# Patient Record
Sex: FEMALE | Race: WHITE | NOT HISPANIC OR LATINO | Employment: OTHER | URBAN - METROPOLITAN AREA
[De-identification: names, ages, dates, MRNs, and addresses within clinical notes are randomized per-mention and may not be internally consistent; named-entity substitution may affect disease eponyms.]

---

## 2017-11-25 ENCOUNTER — HOSPITAL ENCOUNTER (EMERGENCY)
Facility: MEDICAL CENTER | Age: 77
End: 2017-11-26
Attending: EMERGENCY MEDICINE
Payer: MEDICARE

## 2017-11-25 ENCOUNTER — APPOINTMENT (OUTPATIENT)
Dept: RADIOLOGY | Facility: MEDICAL CENTER | Age: 77
End: 2017-11-25
Attending: EMERGENCY MEDICINE
Payer: MEDICARE

## 2017-11-25 DIAGNOSIS — K64.4 EXTERNAL HEMORRHOID, BLEEDING: ICD-10-CM

## 2017-11-25 DIAGNOSIS — K80.20 SYMPTOMATIC CHOLELITHIASIS: ICD-10-CM

## 2017-11-25 LAB
ALBUMIN SERPL BCP-MCNC: 3.6 G/DL (ref 3.2–4.9)
ALBUMIN/GLOB SERPL: 1.5 G/DL
ALP SERPL-CCNC: 49 U/L (ref 30–99)
ALT SERPL-CCNC: 6 U/L (ref 2–50)
ANION GAP SERPL CALC-SCNC: 7 MMOL/L (ref 0–11.9)
AST SERPL-CCNC: 11 U/L (ref 12–45)
BASOPHILS # BLD AUTO: 0.3 % (ref 0–1.8)
BASOPHILS # BLD: 0.02 K/UL (ref 0–0.12)
BILIRUB SERPL-MCNC: 0.5 MG/DL (ref 0.1–1.5)
BUN SERPL-MCNC: 10 MG/DL (ref 8–22)
CALCIUM SERPL-MCNC: 7.9 MG/DL (ref 8.5–10.5)
CHLORIDE SERPL-SCNC: 107 MMOL/L (ref 96–112)
CO2 SERPL-SCNC: 23 MMOL/L (ref 20–33)
CREAT SERPL-MCNC: 0.72 MG/DL (ref 0.5–1.4)
EOSINOPHIL # BLD AUTO: 0.11 K/UL (ref 0–0.51)
EOSINOPHIL NFR BLD: 1.6 % (ref 0–6.9)
ERYTHROCYTE [DISTWIDTH] IN BLOOD BY AUTOMATED COUNT: 41.3 FL (ref 35.9–50)
GFR SERPL CREATININE-BSD FRML MDRD: >60 ML/MIN/1.73 M 2
GLOBULIN SER CALC-MCNC: 2.4 G/DL (ref 1.9–3.5)
GLUCOSE SERPL-MCNC: 109 MG/DL (ref 65–99)
HCT VFR BLD AUTO: 43.8 % (ref 37–47)
HGB BLD-MCNC: 14.8 G/DL (ref 12–16)
IMM GRANULOCYTES # BLD AUTO: 0.02 K/UL (ref 0–0.11)
IMM GRANULOCYTES NFR BLD AUTO: 0.3 % (ref 0–0.9)
LIPASE SERPL-CCNC: 29 U/L (ref 11–82)
LYMPHOCYTES # BLD AUTO: 0.97 K/UL (ref 1–4.8)
LYMPHOCYTES NFR BLD: 14.2 % (ref 22–41)
MCH RBC QN AUTO: 30.3 PG (ref 27–33)
MCHC RBC AUTO-ENTMCNC: 33.8 G/DL (ref 33.6–35)
MCV RBC AUTO: 89.6 FL (ref 81.4–97.8)
MONOCYTES # BLD AUTO: 0.86 K/UL (ref 0–0.85)
MONOCYTES NFR BLD AUTO: 12.6 % (ref 0–13.4)
NEUTROPHILS # BLD AUTO: 4.87 K/UL (ref 2–7.15)
NEUTROPHILS NFR BLD: 71 % (ref 44–72)
NRBC # BLD AUTO: 0 K/UL
NRBC BLD AUTO-RTO: 0 /100 WBC
PLATELET # BLD AUTO: 174 K/UL (ref 164–446)
PMV BLD AUTO: 11 FL (ref 9–12.9)
POTASSIUM SERPL-SCNC: 3.3 MMOL/L (ref 3.6–5.5)
PROT SERPL-MCNC: 6 G/DL (ref 6–8.2)
RBC # BLD AUTO: 4.89 M/UL (ref 4.2–5.4)
SODIUM SERPL-SCNC: 137 MMOL/L (ref 135–145)
WBC # BLD AUTO: 6.9 K/UL (ref 4.8–10.8)

## 2017-11-25 PROCEDURE — 83690 ASSAY OF LIPASE: CPT

## 2017-11-25 PROCEDURE — 99285 EMERGENCY DEPT VISIT HI MDM: CPT

## 2017-11-25 PROCEDURE — 85025 COMPLETE CBC W/AUTO DIFF WBC: CPT

## 2017-11-25 PROCEDURE — 46600 DIAGNOSTIC ANOSCOPY SPX: CPT

## 2017-11-25 PROCEDURE — 96374 THER/PROPH/DIAG INJ IV PUSH: CPT | Mod: XU

## 2017-11-25 PROCEDURE — 700111 HCHG RX REV CODE 636 W/ 250 OVERRIDE (IP): Performed by: EMERGENCY MEDICINE

## 2017-11-25 PROCEDURE — 93005 ELECTROCARDIOGRAM TRACING: CPT | Performed by: EMERGENCY MEDICINE

## 2017-11-25 PROCEDURE — 84484 ASSAY OF TROPONIN QUANT: CPT

## 2017-11-25 PROCEDURE — 700105 HCHG RX REV CODE 258: Performed by: EMERGENCY MEDICINE

## 2017-11-25 PROCEDURE — 80053 COMPREHEN METABOLIC PANEL: CPT

## 2017-11-25 RX ORDER — ONDANSETRON 2 MG/ML
4 INJECTION INTRAMUSCULAR; INTRAVENOUS ONCE
Status: COMPLETED | OUTPATIENT
Start: 2017-11-25 | End: 2017-11-25

## 2017-11-25 RX ORDER — SODIUM CHLORIDE 9 MG/ML
1000 INJECTION, SOLUTION INTRAVENOUS ONCE
Status: COMPLETED | OUTPATIENT
Start: 2017-11-25 | End: 2017-11-26

## 2017-11-25 RX ADMIN — SODIUM CHLORIDE 1000 ML: 9 INJECTION, SOLUTION INTRAVENOUS at 23:10

## 2017-11-25 RX ADMIN — ONDANSETRON 4 MG: 2 INJECTION INTRAMUSCULAR; INTRAVENOUS at 23:10

## 2017-11-25 ASSESSMENT — PAIN SCALES - GENERAL: PAINLEVEL_OUTOF10: 4

## 2017-11-25 ASSESSMENT — LIFESTYLE VARIABLES: DO YOU DRINK ALCOHOL: NO

## 2017-11-26 VITALS
WEIGHT: 209.44 LBS | DIASTOLIC BLOOD PRESSURE: 72 MMHG | RESPIRATION RATE: 16 BRPM | HEART RATE: 85 BPM | OXYGEN SATURATION: 96 % | BODY MASS INDEX: 34.89 KG/M2 | TEMPERATURE: 98.4 F | HEIGHT: 65 IN | SYSTOLIC BLOOD PRESSURE: 109 MMHG

## 2017-11-26 LAB — TROPONIN I SERPL-MCNC: <0.01 NG/ML (ref 0–0.04)

## 2017-11-26 PROCEDURE — 76705 ECHO EXAM OF ABDOMEN: CPT

## 2017-11-26 NOTE — ED NOTES
Assumed care of patient. Patient complains of LLQ abdominal pain with N/V/D with bloody stools today. Patient was seen at [facility] and told that she had nothing on her CT.  Patient alert and oriented x 4. Patient denies any other complaints at this time. Patient side rails up x 2 and call bell within reach.

## 2017-11-26 NOTE — ED PROVIDER NOTES
"ED Provider Note    Scribed for Rakesh Redding M.D. by Matt Knowles. 11/25/2017, 10:43 PM.    Primary care provider: Pcp Pt States None  Means of arrival: ambulance  History obtained from: Patient  History limited by: none    CHIEF COMPLAINT  Chief Complaint   Patient presents with   • Abdominal Pain     Patient complains of LLQ abdominal pain with N/V/D with blood noted in stool.       HPI  Gianna Delarosa is a 77 y.o. female who presents to the Emergency Department for evaluation of left-lower abdominal pain onset a few days ago. The patient reports her abdominal pain has been constant since onset, and at times it radiates to her back. She endorses associated generalized chest pain, bloody stools, nausea, vomiting, diarrhea. She states her chest pain began tonight as she was arriving to the ED, and describes her chest pain as a \"tightness\".  Patient's son reports patient had a small amount of blood in her stool this morning, and patient states the bloody stools began yesterday. The patient adds she has had multiple episodes of emesis since the onset of her symptoms. She was able to tolerate food today per patient's son. She explains her bloody stools as having \"some bright red blood and some clots\". Patient's son notes the patient has a history of external hemorrhoids. Additionally, patient states she was experiencing diarrhea yesterday from 3:00AM-7:00AM that has since resolved. The patient does not note any exacerbating or alleviating factors. Per patient's son, she was seen in [facility] ED yesterday and had an abdominal CT with no findings. She confirms a history of stroke and heart attack in 1997. Patient is wearing a nasal cannula with 2 L of supplemental oxygen on exam. She denies fever, dysuria, constipation, hematemesis, melena.     REVIEW OF SYSTEMS  Pertinent positives include left-sided abdominal pain, chest pain, bloody stools, nausea, vomiting, diarrhea. Patient denies fever, dysuria, " "constipation, hematemesis, melena. All other systems are reviewed and negative.    C.      PAST MEDICAL HISTORY   has a past medical history of A mass; CAD (coronary artery disease); and Immune deficiency disorder (CMS-HCC).    SURGICAL HISTORY   has a past surgical history that includes gyn surgery; other; and cataract phaco with iol (7/22/08).    SOCIAL HISTORY  Social History   Substance Use Topics   • Smoking status: Never Smoker   • Smokeless tobacco: Never Used   • Alcohol use No      History   Drug Use No       FAMILY HISTORY  History reviewed. No pertinent family history.    CURRENT MEDICATIONS  Reviewed.  See Encounter Summary.     ALLERGIES  Allergies   Allergen Reactions   • Aspirin    • Keflex    • Naproxen    • Other Drug      Mycins, antidepressants   • Penicillins    • Telithromycin        PHYSICAL EXAM  VITAL SIGNS: /72   Pulse 85   Temp 36.9 °C (98.4 °F)   Resp 16   Ht 1.651 m (5' 5\")   Wt 95 kg (209 lb 7 oz)   SpO2 91%   BMI 34.85 kg/m²    Pulse ox interpretation: I interpret this pulse ox as low.  Constitutional: Alert, uncomfortable appearing, actively vomiting.   HENT: Head normocephalic, atraumatic, Bilateral external ears normal, Nose normal.  Eyes: Pupils are equal, extraocular movements intact, Conjunctiva normal, Non-icteric.   Neck: Normal range of motion, Supple, No stridor.   Cardiovascular: Regular rate and rhythm. No murmurs, rubs, or gallops.   Thorax & Lungs: No acute respiratory distress, No wheezing, No increased work of breathing. Lungs are clear to auscultation.   Abdomen: Soft, RUQ tenderness  with minimal volunatary guarding, no rebound tenderness. Remainder of abdomen is soft and nontender. Non-distended, No pulsatile masses, No peritoneal signs. Actively vomiting, Non-bloody, non-billious.   Rectal: multiple external hemorrhoids, small amount of bleeding from the external hemorrhoids. No internal hemorrhoids. Scant bright red blood.   Skin: Warm, Dry, No " erythema, No rash.   Extremities: Intact distal pulses, No edema, No tenderness, No cyanosis.    Neurologic: Alert , Normal motor function, Normal sensory function, No focal deficits noted.   Psychiatric: Affect normal, Judgment normal, Mood normal.       DIAGNOSTIC STUDIES / PROCEDURES     LABS  Results for orders placed or performed during the hospital encounter of 11/25/17   COMP METABOLIC PANEL   Result Value Ref Range    Sodium 137 135 - 145 mmol/L    Potassium 3.3 (L) 3.6 - 5.5 mmol/L    Chloride 107 96 - 112 mmol/L    Co2 23 20 - 33 mmol/L    Anion Gap 7.0 0.0 - 11.9    Glucose 109 (H) 65 - 99 mg/dL    Bun 10 8 - 22 mg/dL    Creatinine 0.72 0.50 - 1.40 mg/dL    Calcium 7.9 (L) 8.5 - 10.5 mg/dL    AST(SGOT) 11 (L) 12 - 45 U/L    ALT(SGPT) 6 2 - 50 U/L    Alkaline Phosphatase 49 30 - 99 U/L    Total Bilirubin 0.5 0.1 - 1.5 mg/dL    Albumin 3.6 3.2 - 4.9 g/dL    Total Protein 6.0 6.0 - 8.2 g/dL    Globulin 2.4 1.9 - 3.5 g/dL    A-G Ratio 1.5 g/dL   CBC WITH DIFFERENTIAL   Result Value Ref Range    WBC 6.9 4.8 - 10.8 K/uL    RBC 4.89 4.20 - 5.40 M/uL    Hemoglobin 14.8 12.0 - 16.0 g/dL    Hematocrit 43.8 37.0 - 47.0 %    MCV 89.6 81.4 - 97.8 fL    MCH 30.3 27.0 - 33.0 pg    MCHC 33.8 33.6 - 35.0 g/dL    RDW 41.3 35.9 - 50.0 fL    Platelet Count 174 164 - 446 K/uL    MPV 11.0 9.0 - 12.9 fL    Neutrophils-Polys 71.00 44.00 - 72.00 %    Lymphocytes 14.20 (L) 22.00 - 41.00 %    Monocytes 12.60 0.00 - 13.40 %    Eosinophils 1.60 0.00 - 6.90 %    Basophils 0.30 0.00 - 1.80 %    Immature Granulocytes 0.30 0.00 - 0.90 %    Nucleated RBC 0.00 /100 WBC    Neutrophils (Absolute) 4.87 2.00 - 7.15 K/uL    Lymphs (Absolute) 0.97 (L) 1.00 - 4.80 K/uL    Monos (Absolute) 0.86 (H) 0.00 - 0.85 K/uL    Eos (Absolute) 0.11 0.00 - 0.51 K/uL    Baso (Absolute) 0.02 0.00 - 0.12 K/uL    Immature Granulocytes (abs) 0.02 0.00 - 0.11 K/uL    NRBC (Absolute) 0.00 K/uL   LIPASE   Result Value Ref Range    Lipase 29 11 - 82 U/L   ESTIMATED  GFR   Result Value Ref Range    GFR If African American >60 >60 mL/min/1.73 m 2    GFR If Non African American >60 >60 mL/min/1.73 m 2   TROPONIN   Result Value Ref Range    Troponin I <0.01 0.00 - 0.04 ng/mL   EKG (ER)   Result Value Ref Range    Report       [facility] Emergency Dept.    Test Date:  2017  Pt Name:    IMANI DANIEL                Department: ER  MRN:        [id number]                      Room:        20  Gender:     F                            Technician: [id number]  :        [id number]                   Requested By:ROSEANN BARAHONA  Order #:    [id number]                    Reading MD:    Measurements  Intervals                                Axis  Rate:       70                           P:          24  CO:         188                          QRS:        -15  QRSD:       130                          T:          88  QT:         456  QTc:        493    Interpretive Statements  SINUS RHYTHM  LEFT BUNDLE BRANCH BLOCK  Compared to ECG 2008 23:19:39  Left bundle-branch block now present  Myocardial infarct finding no longer present        All labs were reviewed by me.      EKG  12 Lead EKG interpreted by me to show:  Normal sinus rhythm  Rate of 70   Left bundle branch block   Intervals: Prolonged QRS, otherwise normal intervals  No ST-T wave changes suggestive of ischemia  Impression: Left bundle branch block, Prolonged QRS, otherwise normal EKG      RADIOLOGY  US-GALLBLADDER   Final Result      1.  Cholelithiasis without evidence for biliary obstruction      2.  Right hydronephrosis. Right renal cortical thinning consistent with scarring        The radiologist's interpretation of all radiological studies have been reviewed by me.    Anoscopy Procedure Note  Indication: Rectal bleeding    Procedure: The patient was placed in the left lateral decubitus position.  External inspection of the rectum and perianal area revealed an external hemorrhoid at the 9 o'clock  position.  A digital rectal exam was not performed. The anoscope was gently inserted and the bowel was inspected.  Visualization was excellent.  Mucosa was normal.  Anoscopy revealed a oozing external hemorrhoid.     The patient tolerated the procedure well.    Complication: None       COURSE & MEDICAL DECISION MAKING  Pertinent Labs & Imaging studies reviewed. (See chart for details)    10:43 PM - Patient seen and examined at bedside. Patient will be treated with 1000 mL NS infusion, 4 mg Zofran. The patient is treated with IV fluids secondary to concerns for dehydration from nausea and vomiting. Ordered US gallbladder, CMP, CBC with differential, Lipase to evaluate her symptoms. I will also perform a rectal exam with anoscopy procedure. Patient was counseled on the procedure and the risks associated. Patient and her son are agreeable to the plan as above.     11:32 PM - Performed rectal exam with anoscopy procedure, chaperoned by female RN. See physical exam findings and procedure note as above. Ordered Troponin, EKG to further evaluate.     1:24 AM - I reevaluated the patient at bedside. She is resting comfortably, and she is feeling improved. I updated the patient on her lab and imaging results as above. I advised patient to follow-up with general surgery, for which I will provide a referral. The patient was given return precautions and welcomed to return to the ED with new or worsening symptoms. She understood and verbalized agreement.     Decision Making:  This is a 77 y.o. year old female who presents with abdominal pain, nausea and vomiting. Here on examination the patient has a fairly unremarkable exam, though does have some right upper quadrant tenderness, and external hemorrhoids on rectal exam. External hemorrhoids likely explain the patient's rectal bleeding, particularly in light of no significant lower abdominal pain. Felt to be less likely was diverticulitis given no lower abdominal pain, no fevers,  or significant tenderness in general on exam. The patient does have some mild right upper quadrant tenderness however, an ultrasound here does show gallstones without evidence of cholecystitis. The patient has no significant LFT elevation, leukocytosis, or fever, and given this and general well appearance, I think the patient likely has symptomatic cholelithiasis. The patient was given Zofran here, and had improvement of her vomiting. She declined opioid analgesia, and given her cholelithiasis, the patient will be given follow-up with general surgery, and instructions to return here should she develop severe right upper quadrant pain, intractable vomiting, fever, or any other new or concerning symptoms.    The patient will return for new or worsening symptoms and is stable at the time of discharge.    The patient is referred to a primary physician for blood pressure management, diabetic screening, and for all other preventative health concerns.    DISPOSITION:  Patient will be discharged home in stable condition.    FOLLOW UP:  Viridiana Aden M.D.  [address] [place] [id number]  [phone]    Schedule an appointment as soon as possible for a visit          FINAL IMPRESSION  1. External hemorrhoid, bleeding    2. Symptomatic cholelithiasis          Matt ERICKSON (Sandro), am scribing for, and in the presence of, Rakesh Redding M.D..    Electronically signed by: Matt Knowles (Sandro), 11/25/2017    Rakesh ERICKSON M.D. personally performed the services described in this documentation, as scribed by Matt Knowles in my presence, and it is both accurate and complete.    The note accurately reflects work and decisions made by me.  Rakesh Redding  11/26/2017  1:42 AM

## 2017-11-26 NOTE — ED NOTES
Patient given discharge paperwork and verbalized understanding. Patient out of ED via wheelchair with son. Patient in stable condition and vitals stable and no distress noted.

## 2018-02-04 LAB — EKG IMPRESSION: NORMAL

## 2018-08-18 ENCOUNTER — HOSPITAL ENCOUNTER (EMERGENCY)
Facility: MEDICAL CENTER | Age: 78
End: 2018-08-18
Attending: EMERGENCY MEDICINE
Payer: MEDICARE

## 2018-08-18 VITALS
TEMPERATURE: 98.3 F | DIASTOLIC BLOOD PRESSURE: 82 MMHG | SYSTOLIC BLOOD PRESSURE: 109 MMHG | WEIGHT: 177 LBS | BODY MASS INDEX: 29.45 KG/M2 | RESPIRATION RATE: 16 BRPM | HEART RATE: 80 BPM | OXYGEN SATURATION: 94 %

## 2018-08-18 DIAGNOSIS — B88.9 MITE INFESTATION: ICD-10-CM

## 2018-08-18 PROCEDURE — 99283 EMERGENCY DEPT VISIT LOW MDM: CPT

## 2018-08-18 ASSESSMENT — PAIN SCALES - GENERAL
PAINLEVEL_OUTOF10: 0
PAINLEVEL_OUTOF10: 0

## 2018-08-18 ASSESSMENT — PAIN SCALES - WONG BAKER: WONGBAKER_NUMERICALRESPONSE: DOESN'T HURT AT ALL

## 2018-08-19 NOTE — DISCHARGE INSTRUCTIONS
Mites, Adult  Introduction  Scabies is a skin condition that happens when very small insects get under the skin (infestation). This causes a rash and severe itchiness. Scabies can spread from person to person (is contagious). If you get scabies, it is common for others in your household to get scabies too.  With proper treatment, symptoms usually go away in 2-4 weeks. Scabies usually does not cause lasting problems.  What are the causes?  This condition is caused by mites (Sarcoptes scabiei, or human itch mites) that can only be seen with a microscope. The mites get into the top layer of skin and lay eggs. Scabies can spread from person to person through:  · Close contact with a person who has scabies.  · Contact with infested items, such as towels, bedding, or clothing.  What increases the risk?  This condition is more likely to develop in:  · People who live in nursing homes and other extended-care facilities.  · People who have sexual contact with a partner who has scabies.  · Young children who attend  facilities.  · People who care for others who are at increased risk for scabies.  What are the signs or symptoms?  Symptoms of this condition may include:  · Severe itchiness. This is often worse at night.  · A rash that includes tiny red bumps or blisters. The rash commonly occurs on the wrist, elbow, armpit, fingers, waist, groin, or buttocks. Bumps may form a line (burrow) in some areas.  · Skin irritation. This can include scaly patches or sores.  How is this diagnosed?  This condition is diagnosed with a physical exam. Your health care provider will look closely at your skin. In some cases, your health care provider may take a sample of your affected skin (skin scraping) and have it examined under a microscope.  How is this treated?  This condition may be treated with:  · Medicated cream or lotion that kills the mites. This is spread on the entire body and left on for several hours. Usually, one  treatment with medicated cream or lotion is enough to kill all of the mites. In severe cases, the treatment may be repeated.  · Medicated cream that relieves itching.  · Medicines that help to relieve itching.  · Medicines that kill the mites. This treatment is rarely used.  Follow these instructions at home:     Medicines  · Take or apply over-the-counter and prescription medicines as told by your health care provider.  · Apply medicated cream or lotion as told by your health care provider.  · Do not wash off the medicated cream or lotion until the necessary amount of time has passed.  Skin Care  · Avoid scratching your affected skin.  · Keep your fingernails closely trimmed to reduce injury from scratching.  · Take cool baths or apply cool washcloths to help reduce itching.  General instructions  · Clean all items that you recently had contact with, including bedding, clothing, and furniture. Do this on the same day that your treatment starts.  ¨ Use hot water when you wash items.  ¨ Place unwashable items into closed, airtight plastic bags for at least 3 days. The mites cannot live for more than 3 days away from human skin.  ¨ Vacuum furniture and mattresses that you use.  · Make sure that other people who may have been infested are examined by a health care provider. These include members of your household and anyone who may have had contact with infested items.  · Keep all follow-up visits as told by your health care provider. This is important.  Contact a health care provider if:  · You have itching that does not go away after 4 weeks of treatment.  · You continue to develop new bumps or burrows.  · You have redness, swelling, or pain in your rash area after treatment.  · You have fluid, blood, or pus coming from your rash.  This information is not intended to replace advice given to you by your health care provider. Make sure you discuss any questions you have with your health care provider.  Document  Released: 09/07/2016 Document Revised: 05/25/2017 Document Reviewed: 07/19/2016  © 2017 [employer]

## 2018-08-19 NOTE — ED PROVIDER NOTES
ED Provider Note    CHIEF COMPLAINT  Chief Complaint   Patient presents with   • Rash     Reports first noticed 2 wks ago, and is in groin area and scalp. Pt states she thinks she has scabies.        HPI  Gianna Delarosa is a 77 y.o. female who presents for evaluation of possible mite infestation, resides with her son is also checking in, states that she has been experiencing generalized itching and is concerned about bites.  No fever, no vomiting, no other complaints    REVIEW OF SYSTEMS  Negative for fever, chest pain, dyspnea, abdominal pain, back pain.     PAST MEDICAL HISTORY   has a past medical history of A mass; CAD (coronary artery disease); and Immune deficiency disorder (CMS-HCC) (HCC).    SOCIAL HISTORY  Social History     Social History Main Topics   • Smoking status: Never Smoker   • Smokeless tobacco: Never Used   • Alcohol use No   • Drug use: No   • Sexual activity: Not on file       SURGICAL HISTORY   has a past surgical history that includes gyn surgery; other; and cataract phaco with iol (7/22/08).    CURRENT MEDICATIONS  I personally reviewed the medication list in the charting documentation.     ALLERGIES  Allergies   Allergen Reactions   • Aspirin    • Keflex    • Naproxen    • Other Drug      Mycins, antidepressants   • Penicillins    • Telithromycin        PHYSICAL EXAM  VITAL SIGNS: /82   Pulse 80   Temp 36.8 °C (98.3 °F)   Resp 16   Wt 80.3 kg (177 lb)   SpO2 94%   BMI 29.45 kg/m²   Constitutional: Alert in no apparent distress.  HENT: No signs of trauma.   Eyes: Conjunctiva normal, Non-icteric.   Chest: Normal nonlabored respirations  Skin: Dry skin, a few nonspecific maculopapular lesions, overall mild  Musculoskeletal: Good range of motion in all major joints.   Neurologic: Alert, No focal deficits noted.   Psychiatric: Affect normal, Judgment normal.    COURSE & MEDICAL DECISION MAKING  Pertinent Labs & Imaging studies reviewed. (See chart for details)    Encounter  Summary: This is a 77 y.o. female with concerns over a mite infestation, normal findings on exam but will treat with permethrin, strict return instructions have been discussed      DISPOSITION: Discharge Home      FINAL IMPRESSION  1. Mite infestation        This dictation was created using voice recognition software. The accuracy of the dictation is limited to the abilities of the software. I expect there may be some errors of grammar and possibly content. The nursing notes were reviewed and certain aspects of this information were incorporated into this note.    Electronically signed by: Sergio Leon, 8/18/2018 9:06 PM

## 2018-08-19 NOTE — ED TRIAGE NOTES
Chief Complaint   Patient presents with   • Rash     Reports first noticed 2 wks ago, and is in groin area and scalp. Pt states she thinks she has scabies.      /82   Pulse 80   Temp 36.8 °C (98.3 °F)   Resp 16   Wt 80.3 kg (177 lb)   SpO2 94%   BMI 29.45 kg/m²     Pt to triage via WC. Pt here with son as well. Charge RN notified of pt, roomed to YL55.

## 2018-09-27 ENCOUNTER — APPOINTMENT (OUTPATIENT)
Dept: RADIOLOGY | Facility: MEDICAL CENTER | Age: 78
End: 2018-09-27
Attending: EMERGENCY MEDICINE
Payer: MEDICARE

## 2018-09-27 ENCOUNTER — HOSPITAL ENCOUNTER (EMERGENCY)
Facility: MEDICAL CENTER | Age: 78
End: 2018-09-27
Attending: EMERGENCY MEDICINE
Payer: MEDICARE

## 2018-09-27 VITALS
HEART RATE: 87 BPM | RESPIRATION RATE: 16 BRPM | OXYGEN SATURATION: 98 % | TEMPERATURE: 97.2 F | HEIGHT: 64 IN | SYSTOLIC BLOOD PRESSURE: 118 MMHG | BODY MASS INDEX: 30.22 KG/M2 | WEIGHT: 177.03 LBS | DIASTOLIC BLOOD PRESSURE: 78 MMHG

## 2018-09-27 DIAGNOSIS — E04.1 THYROID NODULE: ICD-10-CM

## 2018-09-27 DIAGNOSIS — R59.0 LYMPHADENOPATHY, SUBMANDIBULAR: ICD-10-CM

## 2018-09-27 LAB
ANION GAP SERPL CALC-SCNC: 8 MMOL/L (ref 0–11.9)
BASOPHILS # BLD AUTO: 0.4 % (ref 0–1.8)
BASOPHILS # BLD: 0.05 K/UL (ref 0–0.12)
BUN SERPL-MCNC: 10 MG/DL (ref 8–22)
CALCIUM SERPL-MCNC: 9.1 MG/DL (ref 8.5–10.5)
CHLORIDE SERPL-SCNC: 103 MMOL/L (ref 96–112)
CO2 SERPL-SCNC: 25 MMOL/L (ref 20–33)
CREAT SERPL-MCNC: 0.81 MG/DL (ref 0.5–1.4)
EOSINOPHIL # BLD AUTO: 0.04 K/UL (ref 0–0.51)
EOSINOPHIL NFR BLD: 0.3 % (ref 0–6.9)
ERYTHROCYTE [DISTWIDTH] IN BLOOD BY AUTOMATED COUNT: 41.4 FL (ref 35.9–50)
GLUCOSE SERPL-MCNC: 97 MG/DL (ref 65–99)
HCT VFR BLD AUTO: 48.7 % (ref 37–47)
HGB BLD-MCNC: 16.3 G/DL (ref 12–16)
IMM GRANULOCYTES # BLD AUTO: 0.03 K/UL (ref 0–0.11)
IMM GRANULOCYTES NFR BLD AUTO: 0.3 % (ref 0–0.9)
LYMPHOCYTES # BLD AUTO: 1.43 K/UL (ref 1–4.8)
LYMPHOCYTES NFR BLD: 12.2 % (ref 22–41)
MCH RBC QN AUTO: 30 PG (ref 27–33)
MCHC RBC AUTO-ENTMCNC: 33.5 G/DL (ref 33.6–35)
MCV RBC AUTO: 89.5 FL (ref 81.4–97.8)
MONOCYTES # BLD AUTO: 0.97 K/UL (ref 0–0.85)
MONOCYTES NFR BLD AUTO: 8.3 % (ref 0–13.4)
NEUTROPHILS # BLD AUTO: 9.19 K/UL (ref 2–7.15)
NEUTROPHILS NFR BLD: 78.5 % (ref 44–72)
NRBC # BLD AUTO: 0 K/UL
NRBC BLD-RTO: 0 /100 WBC
PLATELET # BLD AUTO: 235 K/UL (ref 164–446)
PMV BLD AUTO: 10.7 FL (ref 9–12.9)
POTASSIUM SERPL-SCNC: 3.9 MMOL/L (ref 3.6–5.5)
RBC # BLD AUTO: 5.44 M/UL (ref 4.2–5.4)
SODIUM SERPL-SCNC: 136 MMOL/L (ref 135–145)
TSH SERPL DL<=0.005 MIU/L-ACNC: 1.68 UIU/ML (ref 0.38–5.33)
WBC # BLD AUTO: 11.7 K/UL (ref 4.8–10.8)

## 2018-09-27 PROCEDURE — 80048 BASIC METABOLIC PNL TOTAL CA: CPT

## 2018-09-27 PROCEDURE — 70491 CT SOFT TISSUE NECK W/DYE: CPT

## 2018-09-27 PROCEDURE — 84443 ASSAY THYROID STIM HORMONE: CPT

## 2018-09-27 PROCEDURE — 700117 HCHG RX CONTRAST REV CODE 255: Performed by: EMERGENCY MEDICINE

## 2018-09-27 PROCEDURE — 85025 COMPLETE CBC W/AUTO DIFF WBC: CPT

## 2018-09-27 PROCEDURE — 99284 EMERGENCY DEPT VISIT MOD MDM: CPT

## 2018-09-27 RX ORDER — AMOXICILLIN AND CLAVULANATE POTASSIUM 875; 125 MG/1; MG/1
1 TABLET, FILM COATED ORAL 2 TIMES DAILY
Qty: 20 TAB | Refills: 0 | Status: SHIPPED | OUTPATIENT
Start: 2018-09-27 | End: 2019-12-08

## 2018-09-27 RX ADMIN — IOHEXOL 80 ML: 350 INJECTION, SOLUTION INTRAVENOUS at 16:36

## 2018-09-27 NOTE — ED PROVIDER NOTES
ED Provider Note    Scribed for Luz Benz M.D. by Larry Wise. 9/27/2018, 3:16 PM.    Means of arrival: Walk-in  History obtained from: Patient  History limited by: None    CHIEF COMPLAINT  Chief Complaint   Patient presents with   • Oral Swelling     swelling to left lower jaw, hx of cellulitis   • Laryngitis     states father had vocal cord CA and wants to get it checked.       HPI  Gianna Delarosa is a 78 y.o. female who presents to the Emergency Department complaining of difficulty swallowing.    Patient states she has had difficulty swallowing for several years which has progressively been getting worse.  She feels that something gets stuck in her upper throat region and if she pushes or changes the position of her head she is able to dislodge it.  She states she occasionally coughs up a thick white sputum that she feels is also collecting in the same area.    She reports associated subjective fever with a recent cellulitis on the left jaw region.  She reports voice changes.  She denies night sweats, weight loss or diaphoresis.     Recently was diagnosed with cellulitis by someone at [facility].  She states she finished her antibiotics but is concerned that there might be something more going on given the cellulitis and worsening of her long-standing difficulty swallowing.        REVIEW OF SYSTEMS  Pertinent positives include difficulty swallowing, productive cough, subjective fever, and voice changes. Pertinent negatives include no night sweats, weight loss, diaphoresis. See HPI for further details. All other systems reviewed and negative.     PAST MEDICAL HISTORY   has a past medical history of A mass; CAD (coronary artery disease); and Immune deficiency disorder (HCC).    SURGICAL HISTORY   has a past surgical history that includes gyn surgery; other; and cataract phaco with iol (7/22/08).    SOCIAL HISTORY  Social History   Substance Use Topics   • Smoking status: Never Smoker   •  "Smokeless tobacco: Never Used   • Alcohol use No      History   Drug Use No       FAMILY HISTORY  Father: laryngeal cancer    CURRENT MEDICATIONS  Home Medications     Reviewed by Amirah Reynolds R.N. (Registered Nurse) on 09/27/18 at 1412  Med List Status: Complete   Medication Last Dose Status   permethrin (ELIMITE) 1 % lotion prn Active                ALLERGIES  Allergies   Allergen Reactions   • Aspirin    • Keflex    • Naproxen    • Other Drug      Mycins, antidepressants   • Penicillins    • Telithromycin        PHYSICAL EXAM  VITAL SIGNS: /67   Pulse 94   Temp 36.2 °C (97.2 °F)   Resp 16   Ht 1.626 m (5' 4\")   Wt 80.3 kg (177 lb 0.5 oz)   SpO2 99%   BMI 30.39 kg/m²     General: WDWN, nontoxic appearing in NAD; A+Ox3; V/S as above; afebrile  Skin: warm and dry; good color; no rash   HEENT: NCAT; EOMs intact; PERRL; no scleral icterus   Neck: FROM; no meningismus, no thyromegaly; trachea is midline; oropharynx clear; no trismus, no drooling; soft, nontender, mobile nodes in the submandibular region  Cardiovascular: Regular heart rate and rhythm. No murmurs, rubs, or gallops; pulses 2+ bilaterally radially   Lungs: Clear to auscultation with good air movement bilaterally. No wheezes, rhonchi, or rales.   Abdomen: BS present; soft; NTND; no rebound, guarding, or rigidity. No organomegaly or pulsatile mass; no CVAT   Extremities: SERRANO x 4; no e/o trauma; no pedal edema   Neurologic: CNs III-XII grossly intact; speech clear; distal sensation intact  Psychiatric: Appropriate affect, normal mood                                                           DIAGNOSTIC STUDIES / PROCEDURES    LABS  Results for orders placed or performed during the hospital encounter of 09/27/18   CBC WITH DIFFERENTIAL   Result Value Ref Range    WBC 11.7 (H) 4.8 - 10.8 K/uL    RBC 5.44 (H) 4.20 - 5.40 M/uL    Hemoglobin 16.3 (H) 12.0 - 16.0 g/dL    Hematocrit 48.7 (H) 37.0 - 47.0 %    MCV 89.5 81.4 - 97.8 fL    MCH 30.0 " 27.0 - 33.0 pg    MCHC 33.5 (L) 33.6 - 35.0 g/dL    RDW 41.4 35.9 - 50.0 fL    Platelet Count 235 164 - 446 K/uL    MPV 10.7 9.0 - 12.9 fL    Neutrophils-Polys 78.50 (H) 44.00 - 72.00 %    Lymphocytes 12.20 (L) 22.00 - 41.00 %    Monocytes 8.30 0.00 - 13.40 %    Eosinophils 0.30 0.00 - 6.90 %    Basophils 0.40 0.00 - 1.80 %    Immature Granulocytes 0.30 0.00 - 0.90 %    Nucleated RBC 0.00 /100 WBC    Neutrophils (Absolute) 9.19 (H) 2.00 - 7.15 K/uL    Lymphs (Absolute) 1.43 1.00 - 4.80 K/uL    Monos (Absolute) 0.97 (H) 0.00 - 0.85 K/uL    Eos (Absolute) 0.04 0.00 - 0.51 K/uL    Baso (Absolute) 0.05 0.00 - 0.12 K/uL    Immature Granulocytes (abs) 0.03 0.00 - 0.11 K/uL    NRBC (Absolute) 0.00 K/uL   BASIC METABOLIC PANEL   Result Value Ref Range    Sodium 136 135 - 145 mmol/L    Potassium 3.9 3.6 - 5.5 mmol/L    Chloride 103 96 - 112 mmol/L    Co2 25 20 - 33 mmol/L    Glucose 97 65 - 99 mg/dL    Bun 10 8 - 22 mg/dL    Creatinine 0.81 0.50 - 1.40 mg/dL    Calcium 9.1 8.5 - 10.5 mg/dL    Anion Gap 8.0 0.0 - 11.9   TSH   Result Value Ref Range    TSH 1.680 0.380 - 5.330 uIU/mL   ESTIMATED GFR   Result Value Ref Range    GFR If African American >60 >60 mL/min/1.73 m 2    GFR If Non African American >60 >60 mL/min/1.73 m 2       All labs reviewed by me.    RADIOLOGY  CT-SOFT TISSUE NECK WITH   Final Result      1.  Bilateral submandibular adenopathy. Findings could be reactive, inflammatory or neoplastic in etiology. Follow-up is recommended.   2.  Multiple prominent cervical lymph nodes, greater on the right.   3.  1.6 cm left thyroid nodule. Follow-up ultrasound is recommended.        The radiologist's interpretation of all radiological studies have been reviewed by me.    COURSE & MEDICAL DECISION MAKING  Pertinent Labs & Imaging studies reviewed. (See chart for details)    Gianna Delarosa is a 78 y.o. female who presents complaining of difficulty swallowing, voice change, and feeling like something gets stuck in her  throat occasionally.  This has been progressive.  Patient is able to take p.o.    Patient does have adenopathy on exam in the submandibular region.  No airway concerns at this time.  Patient's father has a history of laryngeal cancer.    3:16 PM - Patient seen and examined at bedside. Ordered CT soft tissue with contrast, TSH, BMP, and CBC with differential to evaluate her symptoms.    5:22 PM  Labs and CT results are noted.  Given the adenopathy that may be reactive, we will place the patient on Augmentin and refer to PCP and ENT for follow-up.  I will place her on Augmentin to treat any underlying infection.    FINAL IMPRESSION  1. Lymphadenopathy, submandibular    2. Thyroid nodule          Larry ERICKSON (Scribe), am scribing for, and in the presence of, Luz Benz M.D..    Electronically signed by: Larry Wise (Scribe), 9/27/2018    ILuz M.D. personally performed the services described in this documentation, as scribed by Larry Wise in my presence, and it is both accurate and complete. C    The note accurately reflects work and decisions made by me.  Luz Benz  9/27/2018  5:42 PM

## 2018-09-27 NOTE — ED TRIAGE NOTES
Gianna Delarosa  Chief Complaint   Patient presents with   • Oral Swelling     swelling to left lower jaw, hx of cellulitis   • Laryngitis     states father had vocal cord CA and wants to get it checked.     Pt to triage in w/c with above complaint. VSS.   Pt returned to lobby, educated on triage process, and to inform staff of any changes or concerns.

## 2019-12-08 ENCOUNTER — HOSPITAL ENCOUNTER (EMERGENCY)
Facility: MEDICAL CENTER | Age: 79
End: 2019-12-08
Attending: EMERGENCY MEDICINE
Payer: MEDICARE

## 2019-12-08 VITALS
HEIGHT: 64 IN | SYSTOLIC BLOOD PRESSURE: 129 MMHG | DIASTOLIC BLOOD PRESSURE: 85 MMHG | RESPIRATION RATE: 16 BRPM | BODY MASS INDEX: 25.61 KG/M2 | HEART RATE: 75 BPM | WEIGHT: 150 LBS | TEMPERATURE: 98.5 F | OXYGEN SATURATION: 97 %

## 2019-12-08 DIAGNOSIS — B02.9 HERPES ZOSTER WITHOUT COMPLICATION: ICD-10-CM

## 2019-12-08 PROCEDURE — 700102 HCHG RX REV CODE 250 W/ 637 OVERRIDE(OP): Performed by: EMERGENCY MEDICINE

## 2019-12-08 PROCEDURE — A9270 NON-COVERED ITEM OR SERVICE: HCPCS | Performed by: EMERGENCY MEDICINE

## 2019-12-08 PROCEDURE — 99284 EMERGENCY DEPT VISIT MOD MDM: CPT

## 2019-12-08 RX ORDER — OXYCODONE HYDROCHLORIDE 5 MG/1
5 TABLET ORAL EVERY 4 HOURS PRN
Qty: 5 TAB | Refills: 0 | Status: SHIPPED | OUTPATIENT
Start: 2019-12-08 | End: 2019-12-11

## 2019-12-08 RX ORDER — NAPROXEN 500 MG/1
500 TABLET ORAL ONCE
Status: COMPLETED | OUTPATIENT
Start: 2019-12-08 | End: 2019-12-08

## 2019-12-08 RX ORDER — VALACYCLOVIR HYDROCHLORIDE 500 MG/1
1000 TABLET, FILM COATED ORAL ONCE
Status: COMPLETED | OUTPATIENT
Start: 2019-12-08 | End: 2019-12-08

## 2019-12-08 RX ORDER — VALACYCLOVIR HYDROCHLORIDE 1 G/1
1000 TABLET, FILM COATED ORAL 3 TIMES DAILY
Qty: 21 TAB | Refills: 0 | Status: SHIPPED | OUTPATIENT
Start: 2019-12-08 | End: 2019-12-15

## 2019-12-08 RX ADMIN — VALACYCLOVIR HYDROCHLORIDE 1000 MG: 500 TABLET, FILM COATED ORAL at 11:45

## 2019-12-08 RX ADMIN — Medication 500 MG: at 11:45

## 2019-12-08 NOTE — ED TRIAGE NOTES
Chief Complaint   Patient presents with   • Neck Pain   • Rash   • Lump   Pt to triage in NAD.  Pt with multiple complaints but primary complaint is neck pain and rash.  Pt reports thyroid swelling and lump to left neck.  Pt educated on triage process and instructed to notify triage RN of any change in status.

## 2019-12-08 NOTE — ED NOTES
Pt received discharge instructions and prescriptions, pt understood all including follow up, pt ambulated to lobby with no difficulty

## 2019-12-08 NOTE — ED PROVIDER NOTES
"ED Provider Note    Scribed for Iftikhar Lockett M.D. by Joya Cardenas. 12/8/2019  11:01 AM    Primary care provider: Pcp Pt States None  Means of arrival: Walk-in  History obtained from: Patient  History limited by: None    CHIEF COMPLAINT  Chief Complaint   Patient presents with   • Neck Pain   • Rash   • Lump       HPI  Gianna Delarosa is a 79 y.o. female who presents to the Emergency Department for painful rash on neck onset 5-6 days. She notes the pain radiates from her left sided neck to the left side of the back of her head. No alleviating or exacerbating factors. Patient notes she did not report to the ED sooner because she was unable to get here. She denies any fevers.     REVIEW OF SYSTEMS  Pertinent positives include painful rash on neck. Pertinent negatives include no fevers.     PAST MEDICAL HISTORY   has a past medical history of A mass, CAD (coronary artery disease), and Immune deficiency disorder (HCC).    SURGICAL HISTORY   has a past surgical history that includes gyn surgery; other; and cataract phaco with iol (7/22/08).    SOCIAL HISTORY  Social History     Tobacco Use   • Smoking status: Never Smoker   • Smokeless tobacco: Never Used   Substance Use Topics   • Alcohol use: No   • Drug use: No      Social History     Substance and Sexual Activity   Drug Use No       FAMILY HISTORY  None noted.     CURRENT MEDICATIONS  None noted.    ALLERGIES  Allergies   Allergen Reactions   • Aspirin    • Keflex    • Naproxen    • Other Drug      Mycins, antidepressants   • Penicillins    • Telithromycin        PHYSICAL EXAM  VITAL SIGNS: /87   Pulse 91   Temp 36.9 °C (98.5 °F) (Temporal)   Resp 16   Ht 1.626 m (5' 4\")   Wt 68 kg (150 lb)   SpO2 96%   BMI 25.75 kg/m²     Constitutional: Well developed, Well nourished, mild distress, Non-toxic appearance.   HENT: Normocephalic, Atraumatic. Poor dentition. Oropharynx moist.   Eyes: PERRL, EOMI, Conjunctiva normal, No discharge.   Neck: no " anterior cervical lymphadenopathy  CV: Good pulses  Thorax & Lungs: No respiratory distress.   Skin: Herpetic type rash on the left lateral aspect of neck going into scalp. Sensitive to touch. Does not cross midline.  Musculoskeletal: No major deformities noted.   Neurologic: Awake, alert. Moves all extremities spontaneously.  Psychiatric: Affect normal, Mood normal.     LABS  Results for orders placed or performed during the hospital encounter of 09/27/18   CBC WITH DIFFERENTIAL   Result Value Ref Range    WBC 11.7 (H) 4.8 - 10.8 K/uL    RBC 5.44 (H) 4.20 - 5.40 M/uL    Hemoglobin 16.3 (H) 12.0 - 16.0 g/dL    Hematocrit 48.7 (H) 37.0 - 47.0 %    MCV 89.5 81.4 - 97.8 fL    MCH 30.0 27.0 - 33.0 pg    MCHC 33.5 (L) 33.6 - 35.0 g/dL    RDW 41.4 35.9 - 50.0 fL    Platelet Count 235 164 - 446 K/uL    MPV 10.7 9.0 - 12.9 fL    Neutrophils-Polys 78.50 (H) 44.00 - 72.00 %    Lymphocytes 12.20 (L) 22.00 - 41.00 %    Monocytes 8.30 0.00 - 13.40 %    Eosinophils 0.30 0.00 - 6.90 %    Basophils 0.40 0.00 - 1.80 %    Immature Granulocytes 0.30 0.00 - 0.90 %    Nucleated RBC 0.00 /100 WBC    Neutrophils (Absolute) 9.19 (H) 2.00 - 7.15 K/uL    Lymphs (Absolute) 1.43 1.00 - 4.80 K/uL    Monos (Absolute) 0.97 (H) 0.00 - 0.85 K/uL    Eos (Absolute) 0.04 0.00 - 0.51 K/uL    Baso (Absolute) 0.05 0.00 - 0.12 K/uL    Immature Granulocytes (abs) 0.03 0.00 - 0.11 K/uL    NRBC (Absolute) 0.00 K/uL   BASIC METABOLIC PANEL   Result Value Ref Range    Sodium 136 135 - 145 mmol/L    Potassium 3.9 3.6 - 5.5 mmol/L    Chloride 103 96 - 112 mmol/L    Co2 25 20 - 33 mmol/L    Glucose 97 65 - 99 mg/dL    Bun 10 8 - 22 mg/dL    Creatinine 0.81 0.50 - 1.40 mg/dL    Calcium 9.1 8.5 - 10.5 mg/dL    Anion Gap 8.0 0.0 - 11.9   TSH   Result Value Ref Range    TSH 1.680 0.380 - 5.330 uIU/mL   ESTIMATED GFR   Result Value Ref Range    GFR If African American >60 >60 mL/min/1.73 m 2    GFR If Non African American >60 >60 mL/min/1.73 m 2        RADIOLOGY  No  orders to display     The radiologist's interpretation of all radiological studies have been reviewed by me.      COURSE & MEDICAL DECISION MAKING  Pertinent Labs & Imaging studies reviewed. (See chart for details)    I reviewed the patient's medical records which showed patient was seen here on 9/27/19 for oral swelling and difficulty swallowing.     11:01 AM - Patient seen and examined at bedside. Discussed with patient her symptoms are likely related to the shingles. Informed patient the chicken pox virus reactivates and causes shingles and advised patient to manage care with over the counter analgesics. Informed patient any drainage from pustules are highly contagious. Patient will be treated with naproxen 500 mg and valtrex caplet 1,000 mg. Will discharge patient home with prescription for valacyclovir 1 gm and Roxicodone 5 mg with strict ED precautions. Patient verbalizes understanding and agreement to this plan of care.      Decision Making:  Patient was shingles, but the patient on Valtrex, I get the patient a few pain medicines, have her take Tylenol or ibuprofen for her pain, have the patient return with worsening symptoms.    The patient will return for new or worsening symptoms and is stable at the time of discharge.    The patient is referred to a primary physician for blood pressure management, diabetic screening, and for all other preventative health concerns.    In prescribing controlled substances to this patient, I certify that I have obtained and reviewed the medical history of Gianna Delarosa. I have also made a good rebecca effort to obtain applicable records from other providers who have treated the patient and records did not demonstrate any increased risk of substance abuse that would prevent me from prescribing controlled substances.     I have conducted a physical exam and documented it. I have reviewed Ms. Delarosa’s prescription history as maintained by the [place] Prescription Monitoring  Program.     I have assessed the patient’s risk for abuse, dependency, and addiction using the validated Opioid Risk Tool available at https://www.mdcalc.com/wnsqyy-elvd-elyl-ort-narcotic-abuse.     Given the above, I believe the benefits of controlled substance therapy outweigh the risks. The reasons for prescribing controlled substances include non-narcotic, oral analgesic alternatives have been inadequate for pain control. Accordingly, I have discussed the risk and benefits, treatment plan, and alternative therapies with the patient.     DISPOSITION:  Patient will be discharged home in stable condition.    FOLLOW UP:  [facility], Emergency Dept  [facility] [facility] [address] [phone]    If symptoms worsen      OUTPATIENT MEDICATIONS:  Discharge Medication List as of 12/8/2019 12:00 PM      START taking these medications    Details   valacyclovir (VALTREX) 1 GM Tab Take 1 Tab by mouth 3 times a day for 7 days., Disp-21 Tab, R-0, Print Rx Paper      oxyCODONE immediate-release (ROXICODONE) 5 MG Tab Take 1 Tab by mouth every four hours as needed for Severe Pain for up to 3 days., Disp-5 Tab, R-0, Print Rx Paper, For 3 days             FINAL IMPRESSION  1. Herpes zoster without complication          I, Joya Cardenas (Sandro), am scribing for, and in the presence of, Iftikhar Lockett M.D..    Electronically signed by: Joya Mendenhall), 12/8/2019    IIftikhar M.D. personally performed the services described in this documentation, as scribed by Joya Cardenas in my presence, and it is both accurate and complete. E.    The note accurately reflects work and decisions made by me.  Iftikhar Lockett  12/8/2019  4:37 PM

## 2021-01-14 DIAGNOSIS — Z23 NEED FOR VACCINATION: ICD-10-CM

## 2021-01-28 NOTE — DISCHARGE INSTRUCTIONS
Follow-up with your new primary care provider as advised  Follow-up with ENT for your thyroid nodule as well as your lymph nodes if they continue to be enlarged  Return to the ER for worsening symptoms such as difficulty breathing, difficulty swallowing, fever, or other concerns      
None

## 2021-03-15 ENCOUNTER — NURSE TRIAGE (OUTPATIENT)
Dept: HEALTH INFORMATION MANAGEMENT | Facility: OTHER | Age: 81
End: 2021-03-15

## 2021-03-15 NOTE — TELEPHONE ENCOUNTER
Pt was discharged AMA from [facility] with a UTI as the case management there was trying to set pt up with SNF and son did not want this. Son is primary caregiver for pt and does everything for pt. Son is bringing pt in today to receive ONLY oral antibiotics per request. He does not want pt getting anymore IV antibiotics. Son also wanted me to convey that he does not want a  involved as he will be taking care of his mother post discharge.    Reason for Disposition  • Patient sounds very sick or weak to the triager    Additional Information  • Negative: Shock suspected (e.g., cold/pale/clammy skin, too weak to stand, low BP, rapid pulse)  • Negative: Sounds like a life-threatening emergency to the triager  • Negative: Followed a genital area injury  • Negative: Followed a genital area injury (penis, scrotum)  • Negative: Vaginal discharge  • Negative: Pus (white, yellow) or bloody discharge from end of penis  • Negative: [1] Taking antibiotic for urinary tract infection (UTI) AND [2] female  • Negative: [1] Taking antibiotic for urinary tract infection (UTI) AND [2] male  • Negative: [1] Discomfort (pain, burning or stinging) when passing urine AND [2] pregnant  • Negative: [1] Discomfort (pain, burning or stinging) when passing urine AND [2] postpartum (from 0 to 6 weeks after delivery)  • Negative: [1] Discomfort (pain, burning or stinging) when passing urine AND [2] female  • Negative: [1] Discomfort (pain, burning or stinging) when passing urine AND [2] male  • Negative: Pain or itching in the vulvar area  • Negative: Pain in scrotum is main symptom  • Negative: Blood in the urine is main symptom  • Negative: Symptoms arising from use of a urinary catheter (Momin or Coude)  • Negative: [1] Unable to urinate (or only a few drops) > 4 hours AND     [2] bladder feels very full (e.g., palpable bladder or strong urge to urinate)  • Negative: [1] Decreased urination and [2] drinking very little AND [2]  "dehydration suspected (e.g., dark urine, no urine > 12 hours, very dry mouth, very lightheaded)    Answer Assessment - Initial Assessment Questions  1. SYMPTOM: \"What's the main symptom you're concerned about?\" (e.g., frequency, incontinence)      Discharged AMA from [facility] with UTI  2. ONSET: \"When did the  this  start?\"      3 days in [facility], discharged today  3. PAIN: \"Is there any pain?\" If so, ask: \"How bad is it?\" (Scale: 1-10; mild, moderate, severe)      unknown  4. CAUSE: \"What do you think is causing the symptoms?\"      uti  5. OTHER SYMPTOMS: \"Do you have any other symptoms?\" (e.g., fever, flank pain, blood in urine, pain with urination)      no  6. PREGNANCY: \"Is there any chance you are pregnant?\" \"When was your last menstrual period?\"      no    Protocols used: URINARY SYMPTOMS-A-AH      "

## 2024-04-01 ENCOUNTER — OFFICE VISIT (OUTPATIENT)
Dept: URGENT CARE | Facility: CLINIC | Age: 84
End: 2024-04-01
Payer: MEDICARE

## 2024-04-01 VITALS
HEIGHT: 56 IN | WEIGHT: 158 LBS | HEART RATE: 85 BPM | SYSTOLIC BLOOD PRESSURE: 126 MMHG | DIASTOLIC BLOOD PRESSURE: 80 MMHG | RESPIRATION RATE: 16 BRPM | BODY MASS INDEX: 35.54 KG/M2 | OXYGEN SATURATION: 96 % | TEMPERATURE: 98.1 F

## 2024-04-01 DIAGNOSIS — K04.7 DENTAL INFECTION: ICD-10-CM

## 2024-04-01 PROCEDURE — 3079F DIAST BP 80-89 MM HG: CPT

## 2024-04-01 PROCEDURE — 3074F SYST BP LT 130 MM HG: CPT

## 2024-04-01 PROCEDURE — 99203 OFFICE O/P NEW LOW 30 MIN: CPT

## 2024-04-01 RX ORDER — CLINDAMYCIN HYDROCHLORIDE 300 MG/1
300 CAPSULE ORAL 3 TIMES DAILY
Qty: 21 CAPSULE | Refills: 0 | Status: SHIPPED | OUTPATIENT
Start: 2024-04-01 | End: 2024-04-08

## 2024-04-01 RX ORDER — CLINDAMYCIN HYDROCHLORIDE 300 MG/1
300 CAPSULE ORAL 3 TIMES DAILY
Qty: 21 CAPSULE | Refills: 0 | Status: SHIPPED | OUTPATIENT
Start: 2024-04-01 | End: 2024-04-01

## 2024-04-01 NOTE — PROGRESS NOTES
"Subjective:   Gianna Delarosa is a 83 y.o. female who presents for Oral Pain (X 3 days, left side oral pain)      HPI: This is an 83-year-old female who was brought in today by her adult son for dental pain.  The patient developed left lower dental pain 3 days ago.  She has had some facial swelling.  The patient has history of multiple broken teeth that need extractions.  The patient does have an appointment with [facility] next week.  She denies any fevers.  No other complaints reported today.    Review of Systems   Constitutional:  Negative for fever.   HENT:          + Dental pain       Medications:    No current outpatient medications on file prior to visit.     No current facility-administered medications on file prior to visit.        Allergies:   Aspirin, Keflex, Naproxen, Other drug, Penicillins, Sulfa drugs, and Telithromycin    Problem List:   There is no problem list on file for this patient.       Surgical History:  Past Surgical History:   Procedure Laterality Date    CATARACT PHACO WITH IOL  7/22/08    Performed by ANETA LANDA at SURGERY [facility] DAY [facility]    GYN SURGERY      opherectomy, partial hyst    OTHER      myringotomty       Past Social Hx:   Social History     Tobacco Use    Smoking status: Never    Smokeless tobacco: Never   Vaping Use    Vaping Use: Unknown   Substance Use Topics    Alcohol use: No    Drug use: No          Problem list, medications, and allergies reviewed by myself today in Epic.     Objective:     /80 (BP Location: Right arm, Patient Position: Sitting, BP Cuff Size: Adult)   Pulse 96   Temp 36.7 °C (98.1 °F) (Temporal)   Resp 16   Ht 1.422 m (4' 8\")   Wt 71.7 kg (158 lb)   SpO2 (!) 85%   BMI 35.42 kg/m²     Physical Exam  Vitals and nursing note reviewed.   Constitutional:       General: She is not in acute distress.     Appearance: Normal appearance. She is normal weight. She is not ill-appearing, toxic-appearing or diaphoretic.   HENT:      " Head: Normocephalic and atraumatic.      Right Ear: Tympanic membrane, ear canal and external ear normal. There is no impacted cerumen.      Left Ear: Tympanic membrane, ear canal and external ear normal. There is no impacted cerumen.      Nose: Nose normal. No congestion or rhinorrhea.      Mouth/Throat:      Mouth: Mucous membranes are moist.      Dentition: Abnormal dentition. Dental tenderness and gingival swelling present.      Pharynx: Oropharynx is clear. No oropharyngeal exudate or posterior oropharyngeal erythema.        Comments: + Several missing teeth, + several broken teeth, + dental tenderness on percussion  Cardiovascular:      Rate and Rhythm: Normal rate and regular rhythm.      Pulses: Normal pulses.      Heart sounds: Normal heart sounds. No murmur heard.     No friction rub. No gallop.   Pulmonary:      Effort: Pulmonary effort is normal. No respiratory distress.      Breath sounds: Normal breath sounds. No stridor. No wheezing, rhonchi or rales.   Chest:      Chest wall: No tenderness.   Musculoskeletal:      Cervical back: Neck supple. No tenderness.   Lymphadenopathy:      Cervical: No cervical adenopathy.   Skin:     General: Skin is warm and dry.      Capillary Refill: Capillary refill takes less than 2 seconds.   Neurological:      General: No focal deficit present.      Mental Status: She is alert and oriented to person, place, and time. Mental status is at baseline.      Gait: Gait normal.   Psychiatric:         Mood and Affect: Mood normal.         Behavior: Behavior normal.         Thought Content: Thought content normal.         Judgment: Judgment normal.         Assessment/Plan:     Diagnosis and associated orders:   1. Dental infection  clindamycin (CLEOCIN) 300 MG Cap    DISCONTINUED: clindamycin (CLEOCIN) 300 MG Cap             Comments/MDM:   Pt is clinically stable at today's acute urgent care visit.  No acute distress noted. Appropriate for outpatient management at this time.      Acute problem  Clindamycin as prescribed  Tylenol per  label instructions  Follow-up with dentist for recheck           Discussed DDx, management options (risks,benefits, and alternatives to planned treatment), natural progression and supportive care.  Expressed understanding and the treatment plan was agreed upon. Questions were encouraged and answered   Return to urgent care prn if new or worsening sx or if there is no improvement in condition prn.    Educated in Red flags and indications to immediately call 911 or present to the Emergency Department.   Advised the patient to follow-up with the primary care physician for recheck, reevaluation, and consideration of further management.    I personally reviewed prior external notes and test results pertinent to today's visit.  I have independently reviewed and interpreted all diagnostics ordered during this urgent care acute visit.     Please note that this dictation was created using voice recognition software. I have made a reasonable attempt to correct obvious errors, but I expect that there are errors of grammar and possibly content that I did not discover before finalizing the note.    This note was electronically signed by LAUREN Araujo

## 2024-04-02 ASSESSMENT — ENCOUNTER SYMPTOMS: FEVER: 0

## 2024-04-04 ENCOUNTER — APPOINTMENT (OUTPATIENT)
Dept: RADIOLOGY | Facility: MEDICAL CENTER | Age: 84
End: 2024-04-04
Attending: STUDENT IN AN ORGANIZED HEALTH CARE EDUCATION/TRAINING PROGRAM
Payer: MEDICARE

## 2024-04-04 ENCOUNTER — HOSPITAL ENCOUNTER (EMERGENCY)
Facility: MEDICAL CENTER | Age: 84
End: 2024-04-04
Attending: STUDENT IN AN ORGANIZED HEALTH CARE EDUCATION/TRAINING PROGRAM
Payer: MEDICARE

## 2024-04-04 VITALS
RESPIRATION RATE: 17 BRPM | BODY MASS INDEX: 23.95 KG/M2 | HEART RATE: 94 BPM | OXYGEN SATURATION: 95 % | TEMPERATURE: 98.7 F | DIASTOLIC BLOOD PRESSURE: 77 MMHG | WEIGHT: 158 LBS | HEIGHT: 68 IN | SYSTOLIC BLOOD PRESSURE: 123 MMHG

## 2024-04-04 DIAGNOSIS — K04.7 DENTAL ABSCESS: ICD-10-CM

## 2024-04-04 DIAGNOSIS — L03.211 FACIAL CELLULITIS: ICD-10-CM

## 2024-04-04 DIAGNOSIS — N39.0 ACUTE UTI: ICD-10-CM

## 2024-04-04 LAB
ALBUMIN SERPL BCP-MCNC: 3.9 G/DL (ref 3.2–4.9)
ALBUMIN/GLOB SERPL: 1.2 G/DL
ALP SERPL-CCNC: 75 U/L (ref 30–99)
ALT SERPL-CCNC: 7 U/L (ref 2–50)
ANION GAP SERPL CALC-SCNC: 16 MMOL/L (ref 7–16)
APPEARANCE UR: CLEAR
AST SERPL-CCNC: 14 U/L (ref 12–45)
BACTERIA #/AREA URNS HPF: NEGATIVE /HPF
BASOPHILS # BLD AUTO: 0.3 % (ref 0–1.8)
BASOPHILS # BLD: 0.03 K/UL (ref 0–0.12)
BILIRUB SERPL-MCNC: 0.5 MG/DL (ref 0.1–1.5)
BILIRUB UR QL STRIP.AUTO: NEGATIVE
BUN SERPL-MCNC: 13 MG/DL (ref 8–22)
CALCIUM ALBUM COR SERPL-MCNC: 9.2 MG/DL (ref 8.5–10.5)
CALCIUM SERPL-MCNC: 9.1 MG/DL (ref 8.5–10.5)
CHLORIDE SERPL-SCNC: 100 MMOL/L (ref 96–112)
CO2 SERPL-SCNC: 19 MMOL/L (ref 20–33)
COLOR UR: YELLOW
CREAT SERPL-MCNC: 0.65 MG/DL (ref 0.5–1.4)
EOSINOPHIL # BLD AUTO: 0 K/UL (ref 0–0.51)
EOSINOPHIL NFR BLD: 0 % (ref 0–6.9)
EPI CELLS #/AREA URNS HPF: NEGATIVE /HPF
ERYTHROCYTE [DISTWIDTH] IN BLOOD BY AUTOMATED COUNT: 40.9 FL (ref 35.9–50)
GFR SERPLBLD CREATININE-BSD FMLA CKD-EPI: 86 ML/MIN/1.73 M 2
GLOBULIN SER CALC-MCNC: 3.2 G/DL (ref 1.9–3.5)
GLUCOSE SERPL-MCNC: 105 MG/DL (ref 65–99)
GLUCOSE UR STRIP.AUTO-MCNC: NEGATIVE MG/DL
HCT VFR BLD AUTO: 47.5 % (ref 37–47)
HGB BLD-MCNC: 16.5 G/DL (ref 12–16)
HYALINE CASTS #/AREA URNS LPF: ABNORMAL /LPF
IMM GRANULOCYTES # BLD AUTO: 0.02 K/UL (ref 0–0.11)
IMM GRANULOCYTES NFR BLD AUTO: 0.2 % (ref 0–0.9)
KETONES UR STRIP.AUTO-MCNC: 40 MG/DL
LACTATE SERPL-SCNC: 1.5 MMOL/L (ref 0.5–2)
LACTATE SERPL-SCNC: 1.6 MMOL/L (ref 0.5–2)
LEUKOCYTE ESTERASE UR QL STRIP.AUTO: ABNORMAL
LYMPHOCYTES # BLD AUTO: 0.73 K/UL (ref 1–4.8)
LYMPHOCYTES NFR BLD: 7.4 % (ref 22–41)
MCH RBC QN AUTO: 31.1 PG (ref 27–33)
MCHC RBC AUTO-ENTMCNC: 34.7 G/DL (ref 32.2–35.5)
MCV RBC AUTO: 89.6 FL (ref 81.4–97.8)
MICRO URNS: ABNORMAL
MONOCYTES # BLD AUTO: 1.27 K/UL (ref 0–0.85)
MONOCYTES NFR BLD AUTO: 12.9 % (ref 0–13.4)
NEUTROPHILS # BLD AUTO: 7.77 K/UL (ref 1.82–7.42)
NEUTROPHILS NFR BLD: 79.2 % (ref 44–72)
NITRITE UR QL STRIP.AUTO: POSITIVE
NRBC # BLD AUTO: 0 K/UL
NRBC BLD-RTO: 0 /100 WBC (ref 0–0.2)
PH UR STRIP.AUTO: 5.5 [PH] (ref 5–8)
PLATELET # BLD AUTO: 196 K/UL (ref 164–446)
PMV BLD AUTO: 11 FL (ref 9–12.9)
POTASSIUM SERPL-SCNC: 3.9 MMOL/L (ref 3.6–5.5)
PROT SERPL-MCNC: 7.1 G/DL (ref 6–8.2)
PROT UR QL STRIP: NEGATIVE MG/DL
RBC # BLD AUTO: 5.3 M/UL (ref 4.2–5.4)
RBC # URNS HPF: ABNORMAL /HPF
RBC UR QL AUTO: ABNORMAL
SODIUM SERPL-SCNC: 135 MMOL/L (ref 135–145)
SP GR UR STRIP.AUTO: 1.01
UROBILINOGEN UR STRIP.AUTO-MCNC: 0.2 MG/DL
WBC # BLD AUTO: 9.8 K/UL (ref 4.8–10.8)
WBC #/AREA URNS HPF: ABNORMAL /HPF

## 2024-04-04 PROCEDURE — 85025 COMPLETE CBC W/AUTO DIFF WBC: CPT

## 2024-04-04 PROCEDURE — 96365 THER/PROPH/DIAG IV INF INIT: CPT | Mod: XU

## 2024-04-04 PROCEDURE — 36415 COLL VENOUS BLD VENIPUNCTURE: CPT

## 2024-04-04 PROCEDURE — 83605 ASSAY OF LACTIC ACID: CPT

## 2024-04-04 PROCEDURE — 87040 BLOOD CULTURE FOR BACTERIA: CPT | Mod: 91

## 2024-04-04 PROCEDURE — 700111 HCHG RX REV CODE 636 W/ 250 OVERRIDE (IP): Mod: JZ,UD | Performed by: STUDENT IN AN ORGANIZED HEALTH CARE EDUCATION/TRAINING PROGRAM

## 2024-04-04 PROCEDURE — 87086 URINE CULTURE/COLONY COUNT: CPT

## 2024-04-04 PROCEDURE — 81001 URINALYSIS AUTO W/SCOPE: CPT

## 2024-04-04 PROCEDURE — 80053 COMPREHEN METABOLIC PANEL: CPT

## 2024-04-04 PROCEDURE — 99285 EMERGENCY DEPT VISIT HI MDM: CPT

## 2024-04-04 PROCEDURE — 70487 CT MAXILLOFACIAL W/DYE: CPT

## 2024-04-04 PROCEDURE — 700105 HCHG RX REV CODE 258: Mod: UD | Performed by: STUDENT IN AN ORGANIZED HEALTH CARE EDUCATION/TRAINING PROGRAM

## 2024-04-04 PROCEDURE — 700117 HCHG RX CONTRAST REV CODE 255: Performed by: STUDENT IN AN ORGANIZED HEALTH CARE EDUCATION/TRAINING PROGRAM

## 2024-04-04 RX ORDER — SODIUM CHLORIDE 9 MG/ML
30 INJECTION, SOLUTION INTRAVENOUS ONCE
Status: COMPLETED | OUTPATIENT
Start: 2024-04-04 | End: 2024-04-04

## 2024-04-04 RX ORDER — AMOXICILLIN AND CLAVULANATE POTASSIUM 875; 125 MG/1; MG/1
1 TABLET, FILM COATED ORAL 2 TIMES DAILY
Qty: 20 TABLET | Refills: 0 | Status: ACTIVE | OUTPATIENT
Start: 2024-04-04 | End: 2024-04-14

## 2024-04-04 RX ADMIN — IOHEXOL 70 ML: 350 INJECTION, SOLUTION INTRAVENOUS at 22:40

## 2024-04-04 RX ADMIN — AMPICILLIN AND SULBACTAM 3 G: 1; 2 INJECTION, POWDER, FOR SOLUTION INTRAMUSCULAR; INTRAVENOUS at 21:09

## 2024-04-04 RX ADMIN — SODIUM CHLORIDE 1917 ML: 9 INJECTION, SOLUTION INTRAVENOUS at 21:09

## 2024-04-04 ASSESSMENT — LIFESTYLE VARIABLES: DO YOU DRINK ALCOHOL: NO

## 2024-04-04 NOTE — ED TRIAGE NOTES
"Chief Complaint   Patient presents with    Abscess     X 3 days On her L side of her face and went to urgent care and given antibiotics but swelling is getting worse x 24 hours      Patient states the pain is getting worse and ibuprofen is not helping her pain at home. Pt has been on the antibiotics (Clindamycin) for three days. Patient is ax0x4, wheeled to triage with her son. Pt states she has a dental appt because she needs multiple teeth pulled. Pt educated on triage process and wait times. Pt educated to alert staff if condition worsens.     BP (!) 150/89   Pulse (!) 102   Temp 37.7 °C (99.8 °F) (Temporal)   Resp 17   Ht 1.727 m (5' 8\")   Wt 71.7 kg (158 lb)   SpO2 96%   BMI 24.02 kg/m²     "
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
## 2024-04-05 NOTE — ED PROVIDER NOTES
"ED Provider Note    CHIEF COMPLAINT  Chief Complaint   Patient presents with    Abscess     X 3 days On her L side of her face and went to urgent care and given antibiotics but swelling is getting worse x 24 hours        EXTERNAL RECORDS REVIEWED  Outpatient Notes urgent care note on 4/1/2024 for facial swelling    HPI/ROS  LIMITATION TO HISTORY   Select: : None  OUTSIDE HISTORIAN(S):    Gianna Delarosa is a 83 y.o. female who presents with left-sided facial swelling for a week.  Patient has been antibiotics for 3 days and this has not improved her swelling.  Patient reports history of poor dentition.  Patient endorses body aches and chills.  Patient denies neck pain.  Patient denies difficulty breathing or swallowing.    PAST MEDICAL HISTORY   has a past medical history of A mass, CAD (coronary artery disease), and Immune deficiency disorder (HCC).    SURGICAL HISTORY   has a past surgical history that includes gyn surgery; other; and cataract phaco with iol (7/22/08).    FAMILY HISTORY  History reviewed. No pertinent family history.    SOCIAL HISTORY  Social History     Tobacco Use    Smoking status: Never    Smokeless tobacco: Never   Vaping Use    Vaping Use: Never used   Substance and Sexual Activity    Alcohol use: No    Drug use: No    Sexual activity: Not on file       CURRENT MEDICATIONS  Home Medications       Reviewed by Varun Hinojosa R.N. (Registered Nurse) on 04/04/24 at 1634  Med List Status: Partial     Medication Last Dose Status   clindamycin (CLEOCIN) 300 MG Cap  Active                    ALLERGIES  Allergies   Allergen Reactions    Aspirin     Keflex     Naproxen     Other Drug      Mycins, antidepressants    Penicillins     Sulfa Drugs     Telithromycin        PHYSICAL EXAM  VITAL SIGNS: /77   Pulse 94   Temp 37.1 °C (98.7 °F) (Temporal)   Resp 17   Ht 1.727 m (5' 8\")   Wt 71.7 kg (158 lb)   SpO2 95%   BMI 24.02 kg/m²    Vitals and nursing note reviewed.   Constitutional:       " Comments: Patient is lying in bed supine, pleasant, conversant, speaking in complete sentences   HENT:   Diffuse dental disease, dental caries.  Tenderness and swelling at the gums of the alveolar ridge on the left especially, tenderness palpation infraorbital area on the left and the left buccal area.  No sublingual elevation, no posterior oropharyngeal erythema or swelling  Eyes:      Extraocular Movements: Extraocular movements intact.      Conjunctiva/sclera: Conjunctivae normal.      Pupils: Pupils are equal, round, and reactive to light.   Cardiovascular:      Pulses: Normal pulses.      Comments:   Pulmonary:      Effort: Pulmonary effort is normal. No respiratory distress.  No stridor.  Abdominal:      Comments: Abdomen is soft, non-tender, non-distended, non-rigid, no rebound, guarding, masses, no McBurney's point tenderness, no peritoneal signs, negative Rovsing sign, negative Valenzuela sign.  No CVA tenderness to palpation. Benign abdomen.   Musculoskeletal:         General: No swelling. Normal range of motion.      Cervical back: Normal range of motion. No rigidity.   Skin:     General: Skin is warm and dry.      Capillary Refill: Capillary refill takes less than 2 seconds.   Neurological:      Mental Status: Alert.       EKG/LABS  Results for orders placed or performed during the hospital encounter of 04/04/24   Lactic Acid   Result Value Ref Range    Lactic Acid 1.6 0.5 - 2.0 mmol/L   Lactic Acid   Result Value Ref Range    Lactic Acid 1.5 0.5 - 2.0 mmol/L   CBC with Differential   Result Value Ref Range    WBC 9.8 4.8 - 10.8 K/uL    RBC 5.30 4.20 - 5.40 M/uL    Hemoglobin 16.5 (H) 12.0 - 16.0 g/dL    Hematocrit 47.5 (H) 37.0 - 47.0 %    MCV 89.6 81.4 - 97.8 fL    MCH 31.1 27.0 - 33.0 pg    MCHC 34.7 32.2 - 35.5 g/dL    RDW 40.9 35.9 - 50.0 fL    Platelet Count 196 164 - 446 K/uL    MPV 11.0 9.0 - 12.9 fL    Neutrophils-Polys 79.20 (H) 44.00 - 72.00 %    Lymphocytes 7.40 (L) 22.00 - 41.00 %     Monocytes 12.90 0.00 - 13.40 %    Eosinophils 0.00 0.00 - 6.90 %    Basophils 0.30 0.00 - 1.80 %    Immature Granulocytes 0.20 0.00 - 0.90 %    Nucleated RBC 0.00 0.00 - 0.20 /100 WBC    Neutrophils (Absolute) 7.77 (H) 1.82 - 7.42 K/uL    Lymphs (Absolute) 0.73 (L) 1.00 - 4.80 K/uL    Monos (Absolute) 1.27 (H) 0.00 - 0.85 K/uL    Eos (Absolute) 0.00 0.00 - 0.51 K/uL    Baso (Absolute) 0.03 0.00 - 0.12 K/uL    Immature Granulocytes (abs) 0.02 0.00 - 0.11 K/uL    NRBC (Absolute) 0.00 K/uL   Complete Metabolic Panel   Result Value Ref Range    Sodium 135 135 - 145 mmol/L    Potassium 3.9 3.6 - 5.5 mmol/L    Chloride 100 96 - 112 mmol/L    Co2 19 (L) 20 - 33 mmol/L    Anion Gap 16.0 7.0 - 16.0    Glucose 105 (H) 65 - 99 mg/dL    Bun 13 8 - 22 mg/dL    Creatinine 0.65 0.50 - 1.40 mg/dL    Calcium 9.1 8.5 - 10.5 mg/dL    Correct Calcium 9.2 8.5 - 10.5 mg/dL    AST(SGOT) 14 12 - 45 U/L    ALT(SGPT) 7 2 - 50 U/L    Alkaline Phosphatase 75 30 - 99 U/L    Total Bilirubin 0.5 0.1 - 1.5 mg/dL    Albumin 3.9 3.2 - 4.9 g/dL    Total Protein 7.1 6.0 - 8.2 g/dL    Globulin 3.2 1.9 - 3.5 g/dL    A-G Ratio 1.2 g/dL   Urinalysis    Specimen: Urine   Result Value Ref Range    Color Yellow     Character Clear     Specific Gravity 1.013 <1.035    Ph 5.5 5.0 - 8.0    Glucose Negative Negative mg/dL    Ketones 40 (A) Negative mg/dL    Protein Negative Negative mg/dL    Bilirubin Negative Negative    Urobilinogen, Urine 0.2 Negative    Nitrite Positive (A) Negative    Leukocyte Esterase Small (A) Negative    Occult Blood Trace (A) Negative    Micro Urine Req Microscopic    ESTIMATED GFR   Result Value Ref Range    GFR (CKD-EPI) 86 >60 mL/min/1.73 m 2   URINE MICROSCOPIC (W/UA)   Result Value Ref Range    WBC 20-50 (A) /hpf    RBC 0-2 /hpf    Bacteria Negative None /hpf    Epithelial Cells Negative /hpf    Hyaline Cast 0-2 /lpf     I have independently interpreted this EKG    RADIOLOGY  I have independently interpreted the diagnostic imaging  associated with this visit and am waiting the final reading from the radiologist.   My preliminary interpretation is as follows: No facial abscess    Radiologist interpretation:  CT-MAXILLOFACIAL WITH PLUS RECONS   Final Result         1.  No acute traumatic facial bony injuries identified.   2.  No enhancing fluid collection identified to indicate abscess.   3.  Left facial subcutaneous fat stranding, appearance favoring changes of cellulitis.   4.  Lucency surrounding multiple mandibular and maxillary tooth roots, appearance favoring periodontal disease and/or odontogenic abscess.   5.  Atherosclerosis          COURSE & MEDICAL DECISION MAKING    ASSESSMENT, COURSE AND PLAN  Care Narrative: CBC to evaluate for acute anemia and leukocytosis.  CMP to evaluate for acute electrolyte abnormality, acute kidney injury, acute liver failure or dysfunction.  Epic workup, blood cultures, lactic acid pending.  CT max face to evaluate for facial abscess.  IV fluids pending.  Patient may require operative intervention.  Disposition pending imaging.    Electronically signed by: Efren Del Castillo M.D., 4/4/2024 7:02 PM    Urinalysis consistent with UTI.  Lab work demonstrates hemoconcentration and low bicarb likely secondary to dehydration, this is also consistent with patient's low-grade tachycardia.  No leukocytosis, sepsis less likely at this time, lactic acid within normal limits.  Patient and son are adamant upon going home and are refusing to stay no matter what.  Given this preference patient will be discharged with Augmentin.  I have reviewed antibiotic choice with [facility] ED pharmacist who recommends Augmentin to cover for most likely  urinary kanchan and oral kanchan.  Patient is agreeable to this plan and reports that she will come to the hospital if she worsens at all.  Patient reports she will follow-up with oral surgery.  CT imaging demonstrates cellulitis of the face and then periodontal disease and or odontogenic  abscess.    This dictation has been created using voice recognition software. I am continuously working with the software to minimize the number of voice recognition errors and I have made every attempt to manually correct the errors within my dictation. However errors  related to this voice recognition software may still exist and should be interpreted within the appropriate context.     Electronically signed by: Efren Del Castillo M.D., 4/4/2024 11:37 PM      Hydration: HYDRATION: Based on the patient's presentation of Dehydration the patient was given IV fluids. IV Hydration was used because oral hydration was not adequate alone. Upon recheck following hydration, the patient was improved.            DISPOSITION AND DISCUSSIONS    Escalation of care considered, and ultimately not performed:after discussion with the patient / family, they have elected to decline an escalation in care    Barriers to care at this time, including but not limited to: Patient does not have established PCP.     Decision tools and prescription drugs considered including, but not limited to: Pain Medications   over-the-counter pain medications are appropriate, narcotics not indicated at this time  .    FINAL DIAGNOSIS  1. Acute UTI    2. Facial cellulitis    3. Dental abscess           Electronically signed by: Efren Del Castillo M.D., 4/4/2024 6:58 PM

## 2024-04-05 NOTE — ED NOTES
Pt refusing PIV and blood work, stating they don't want to be admitted, pt and caregiver educated on ED process and importance of tests. Pt educated that she does not need to stay overnight and is able to decline once completely informed of risks. Pt and caregiver requesting time to think about risks and benefits.     Report to BENITA Lubin

## 2024-04-05 NOTE — ED NOTES
Pt ready for discharge. Discharge education was provided to pt by this RN. Pt verbalized understanding & all questions were answered. IV removed. Pt AoX 4. Pt left in personal wheelchair by pt's son out of the ED with all belongings. Pt encouraged to come back if symptoms worsen.

## 2024-04-07 LAB
BACTERIA UR CULT: NORMAL
SIGNIFICANT IND 70042: NORMAL
SITE SITE: NORMAL
SOURCE SOURCE: NORMAL

## 2024-04-09 LAB
BACTERIA BLD CULT: NORMAL
BACTERIA BLD CULT: NORMAL
SIGNIFICANT IND 70042: NORMAL
SIGNIFICANT IND 70042: NORMAL
SITE SITE: NORMAL
SITE SITE: NORMAL
SOURCE SOURCE: NORMAL
SOURCE SOURCE: NORMAL

## 2024-06-13 ENCOUNTER — APPOINTMENT (OUTPATIENT)
Dept: RADIOLOGY | Facility: MEDICAL CENTER | Age: 84
End: 2024-06-13
Payer: MEDICARE

## 2024-06-13 ENCOUNTER — APPOINTMENT (OUTPATIENT)
Dept: RADIOLOGY | Facility: MEDICAL CENTER | Age: 84
End: 2024-06-13
Attending: EMERGENCY MEDICINE
Payer: MEDICARE

## 2024-06-13 ENCOUNTER — HOSPITAL ENCOUNTER (EMERGENCY)
Facility: MEDICAL CENTER | Age: 84
End: 2024-06-13
Attending: EMERGENCY MEDICINE
Payer: MEDICARE

## 2024-06-13 VITALS
HEART RATE: 91 BPM | BODY MASS INDEX: 23.95 KG/M2 | HEIGHT: 68 IN | WEIGHT: 158 LBS | DIASTOLIC BLOOD PRESSURE: 89 MMHG | RESPIRATION RATE: 19 BRPM | OXYGEN SATURATION: 93 % | TEMPERATURE: 98.4 F | SYSTOLIC BLOOD PRESSURE: 192 MMHG

## 2024-06-13 DIAGNOSIS — N12 PYELONEPHRITIS: ICD-10-CM

## 2024-06-13 LAB
ALBUMIN SERPL BCP-MCNC: 3.7 G/DL (ref 3.2–4.9)
ALBUMIN/GLOB SERPL: 1.2 G/DL
ALP SERPL-CCNC: 66 U/L (ref 30–99)
ALT SERPL-CCNC: 5 U/L (ref 2–50)
ANION GAP SERPL CALC-SCNC: 14 MMOL/L (ref 7–16)
APPEARANCE UR: CLEAR
AST SERPL-CCNC: 12 U/L (ref 12–45)
BACTERIA #/AREA URNS HPF: ABNORMAL /HPF
BASOPHILS # BLD AUTO: 0.3 % (ref 0–1.8)
BASOPHILS # BLD: 0.03 K/UL (ref 0–0.12)
BILIRUB SERPL-MCNC: 0.8 MG/DL (ref 0.1–1.5)
BILIRUB UR QL STRIP.AUTO: NEGATIVE
BUN SERPL-MCNC: 18 MG/DL (ref 8–22)
CALCIUM ALBUM COR SERPL-MCNC: 9.1 MG/DL (ref 8.5–10.5)
CALCIUM SERPL-MCNC: 8.9 MG/DL (ref 8.5–10.5)
CHLORIDE SERPL-SCNC: 104 MMOL/L (ref 96–112)
CO2 SERPL-SCNC: 20 MMOL/L (ref 20–33)
COLOR UR: YELLOW
CREAT SERPL-MCNC: 0.61 MG/DL (ref 0.5–1.4)
EKG IMPRESSION: NORMAL
EOSINOPHIL # BLD AUTO: 0 K/UL (ref 0–0.51)
EOSINOPHIL NFR BLD: 0 % (ref 0–6.9)
EPI CELLS #/AREA URNS HPF: NEGATIVE /HPF
ERYTHROCYTE [DISTWIDTH] IN BLOOD BY AUTOMATED COUNT: 42.2 FL (ref 35.9–50)
GFR SERPLBLD CREATININE-BSD FMLA CKD-EPI: 88 ML/MIN/1.73 M 2
GLOBULIN SER CALC-MCNC: 3.1 G/DL (ref 1.9–3.5)
GLUCOSE SERPL-MCNC: 107 MG/DL (ref 65–99)
GLUCOSE UR STRIP.AUTO-MCNC: NEGATIVE MG/DL
HCT VFR BLD AUTO: 45.7 % (ref 37–47)
HGB BLD-MCNC: 15.2 G/DL (ref 12–16)
HYALINE CASTS #/AREA URNS LPF: ABNORMAL /LPF
IMM GRANULOCYTES # BLD AUTO: 0.03 K/UL (ref 0–0.11)
IMM GRANULOCYTES NFR BLD AUTO: 0.3 % (ref 0–0.9)
KETONES UR STRIP.AUTO-MCNC: 80 MG/DL
LEUKOCYTE ESTERASE UR QL STRIP.AUTO: ABNORMAL
LIPASE SERPL-CCNC: 28 U/L (ref 11–82)
LYMPHOCYTES # BLD AUTO: 0.65 K/UL (ref 1–4.8)
LYMPHOCYTES NFR BLD: 6.6 % (ref 22–41)
MCH RBC QN AUTO: 30 PG (ref 27–33)
MCHC RBC AUTO-ENTMCNC: 33.3 G/DL (ref 32.2–35.5)
MCV RBC AUTO: 90.1 FL (ref 81.4–97.8)
MICRO URNS: ABNORMAL
MONOCYTES # BLD AUTO: 0.62 K/UL (ref 0–0.85)
MONOCYTES NFR BLD AUTO: 6.3 % (ref 0–13.4)
NEUTROPHILS # BLD AUTO: 8.49 K/UL (ref 1.82–7.42)
NEUTROPHILS NFR BLD: 86.5 % (ref 44–72)
NITRITE UR QL STRIP.AUTO: POSITIVE
NRBC # BLD AUTO: 0 K/UL
NRBC BLD-RTO: 0 /100 WBC (ref 0–0.2)
PH UR STRIP.AUTO: 5.5 [PH] (ref 5–8)
PLATELET # BLD AUTO: 233 K/UL (ref 164–446)
PMV BLD AUTO: 11 FL (ref 9–12.9)
POTASSIUM SERPL-SCNC: 3.8 MMOL/L (ref 3.6–5.5)
PROT SERPL-MCNC: 6.8 G/DL (ref 6–8.2)
PROT UR QL STRIP: NEGATIVE MG/DL
RBC # BLD AUTO: 5.07 M/UL (ref 4.2–5.4)
RBC # URNS HPF: ABNORMAL /HPF
RBC UR QL AUTO: ABNORMAL
SODIUM SERPL-SCNC: 138 MMOL/L (ref 135–145)
SP GR UR STRIP.AUTO: 1.03
TROPONIN T SERPL-MCNC: 11 NG/L (ref 6–19)
UROBILINOGEN UR STRIP.AUTO-MCNC: 0.2 MG/DL
WBC # BLD AUTO: 9.8 K/UL (ref 4.8–10.8)
WBC #/AREA URNS HPF: ABNORMAL /HPF

## 2024-06-13 PROCEDURE — 96374 THER/PROPH/DIAG INJ IV PUSH: CPT | Mod: XU

## 2024-06-13 PROCEDURE — 80053 COMPREHEN METABOLIC PANEL: CPT

## 2024-06-13 PROCEDURE — 71045 X-RAY EXAM CHEST 1 VIEW: CPT

## 2024-06-13 PROCEDURE — 74177 CT ABD & PELVIS W/CONTRAST: CPT

## 2024-06-13 PROCEDURE — 81001 URINALYSIS AUTO W/SCOPE: CPT

## 2024-06-13 PROCEDURE — 84484 ASSAY OF TROPONIN QUANT: CPT

## 2024-06-13 PROCEDURE — 700111 HCHG RX REV CODE 636 W/ 250 OVERRIDE (IP): Performed by: EMERGENCY MEDICINE

## 2024-06-13 PROCEDURE — 99285 EMERGENCY DEPT VISIT HI MDM: CPT

## 2024-06-13 PROCEDURE — 93005 ELECTROCARDIOGRAM TRACING: CPT

## 2024-06-13 PROCEDURE — 93005 ELECTROCARDIOGRAM TRACING: CPT | Performed by: EMERGENCY MEDICINE

## 2024-06-13 PROCEDURE — 96375 TX/PRO/DX INJ NEW DRUG ADDON: CPT

## 2024-06-13 PROCEDURE — 83690 ASSAY OF LIPASE: CPT

## 2024-06-13 PROCEDURE — 700117 HCHG RX CONTRAST REV CODE 255: Performed by: EMERGENCY MEDICINE

## 2024-06-13 PROCEDURE — 700105 HCHG RX REV CODE 258: Mod: UD | Performed by: EMERGENCY MEDICINE

## 2024-06-13 PROCEDURE — 36415 COLL VENOUS BLD VENIPUNCTURE: CPT

## 2024-06-13 PROCEDURE — 85025 COMPLETE CBC W/AUTO DIFF WBC: CPT

## 2024-06-13 PROCEDURE — 96376 TX/PRO/DX INJ SAME DRUG ADON: CPT

## 2024-06-13 RX ORDER — CEFDINIR 300 MG/1
300 CAPSULE ORAL 2 TIMES DAILY
Qty: 14 CAPSULE | Refills: 0 | Status: ACTIVE | OUTPATIENT
Start: 2024-06-13

## 2024-06-13 RX ORDER — ONDANSETRON 2 MG/ML
4 INJECTION INTRAMUSCULAR; INTRAVENOUS ONCE
Status: COMPLETED | OUTPATIENT
Start: 2024-06-13 | End: 2024-06-13

## 2024-06-13 RX ORDER — PROCHLORPERAZINE EDISYLATE 5 MG/ML
10 INJECTION INTRAMUSCULAR; INTRAVENOUS ONCE
Status: COMPLETED | OUTPATIENT
Start: 2024-06-13 | End: 2024-06-13

## 2024-06-13 RX ORDER — MORPHINE SULFATE 4 MG/ML
2 INJECTION INTRAVENOUS ONCE
Status: DISCONTINUED | OUTPATIENT
Start: 2024-06-13 | End: 2024-06-13

## 2024-06-13 RX ORDER — CEFTRIAXONE 2 G/1
2000 INJECTION, POWDER, FOR SOLUTION INTRAMUSCULAR; INTRAVENOUS ONCE
Status: COMPLETED | OUTPATIENT
Start: 2024-06-13 | End: 2024-06-13

## 2024-06-13 RX ORDER — DIPHENHYDRAMINE HYDROCHLORIDE 50 MG/ML
12.5 INJECTION INTRAMUSCULAR; INTRAVENOUS ONCE
Status: COMPLETED | OUTPATIENT
Start: 2024-06-13 | End: 2024-06-13

## 2024-06-13 RX ORDER — MORPHINE SULFATE 4 MG/ML
2 INJECTION INTRAVENOUS ONCE
Status: COMPLETED | OUTPATIENT
Start: 2024-06-13 | End: 2024-06-13

## 2024-06-13 RX ORDER — SODIUM CHLORIDE 9 MG/ML
1000 INJECTION, SOLUTION INTRAVENOUS ONCE
Status: COMPLETED | OUTPATIENT
Start: 2024-06-13 | End: 2024-06-13

## 2024-06-13 RX ORDER — HYDROCODONE BITARTRATE AND ACETAMINOPHEN 5; 325 MG/1; MG/1
1 TABLET ORAL EVERY 6 HOURS PRN
Qty: 8 TABLET | Refills: 0 | Status: SHIPPED | OUTPATIENT
Start: 2024-06-13 | End: 2024-06-15

## 2024-06-13 RX ORDER — ONDANSETRON 4 MG/1
4 TABLET, ORALLY DISINTEGRATING ORAL EVERY 8 HOURS PRN
Qty: 8 TABLET | Refills: 0 | Status: SHIPPED | OUTPATIENT
Start: 2024-06-13

## 2024-06-13 RX ADMIN — ONDANSETRON 4 MG: 2 INJECTION INTRAMUSCULAR; INTRAVENOUS at 16:26

## 2024-06-13 RX ADMIN — PROCHLORPERAZINE EDISYLATE 10 MG: 5 INJECTION INTRAMUSCULAR; INTRAVENOUS at 16:53

## 2024-06-13 RX ADMIN — MORPHINE SULFATE 2 MG: 4 INJECTION INTRAVENOUS at 19:11

## 2024-06-13 RX ADMIN — ONDANSETRON 4 MG: 2 INJECTION INTRAMUSCULAR; INTRAVENOUS at 14:26

## 2024-06-13 RX ADMIN — CEFTRIAXONE SODIUM 2000 MG: 2 INJECTION, POWDER, FOR SOLUTION INTRAMUSCULAR; INTRAVENOUS at 19:15

## 2024-06-13 RX ADMIN — ONDANSETRON 4 MG: 2 INJECTION INTRAMUSCULAR; INTRAVENOUS at 19:11

## 2024-06-13 RX ADMIN — SODIUM CHLORIDE 1000 ML: 9 INJECTION, SOLUTION INTRAVENOUS at 14:26

## 2024-06-13 RX ADMIN — IOHEXOL 100 ML: 350 INJECTION, SOLUTION INTRAVENOUS at 19:00

## 2024-06-13 RX ADMIN — DIPHENHYDRAMINE HYDROCHLORIDE 12.5 MG: 50 INJECTION, SOLUTION INTRAMUSCULAR; INTRAVENOUS at 16:51

## 2024-06-13 ASSESSMENT — FIBROSIS 4 INDEX: FIB4 SCORE: 2.267786838055363363

## 2024-06-13 ASSESSMENT — PAIN DESCRIPTION - PAIN TYPE: TYPE: ACUTE PAIN

## 2024-06-13 NOTE — ED NOTES
Patient assisted to restroom and back to room via wheelchair with BENITA Javier without incident. Urine collected and sent to lab.

## 2024-06-13 NOTE — ED NOTES
Pt brought back in WC.  Pt changed into gown and connected to monitor.  Pt states she does not need to void at this time. Pt educated that urine sample needed.  Son at bedside. Call light within reach.

## 2024-06-13 NOTE — ED PROVIDER NOTES
ED Provider Note    CHIEF COMPLAINT  Chief Complaint   Patient presents with    Abdominal Pain     PT reports ULQ pain x 1 week. PT reports NV since onset. PT reports constipation x 4 days. Denies urinary discomfort.          EXTERNAL RECORDS REVIEWED  Note from April 1 reviewed for dental pain.    ER note from April 4 reviewed for facial abscess after the above presentation for dental pain.      HPI    Gianna Delarosa is a 83 y.o. female who presents to the Emergency Department for a week of left upper quadrant abdominal pain radiating to the back.  There is no clear fever but she has had nausea and vomited a few times a day.  She has had no bowel movement in 4 days.  No past surgical history of the abdomen.  Denies gastritis or ulcers.  No dysuria urgency or frequency.  She has chronic back pain.    LIMITATION TO HISTORY   None    OUTSIDE HISTORIAN(S):  Her family provided most of the history above although she confirms some of the details.    REVIEW OF SYSTEMS  Pertinent positives include: Left upper quadrant abdominal pain, back pain, nausea, vomiting, constipation.  Cough  Pertinent negatives include: Fever, dysuria,  sore throat or ill contacts.      PAST MEDICAL HISTORY  Past Medical History:   Diagnosis Date    A mass     lt frontal lobe    CAD (coronary artery disease)     Immune deficiency disorder (HCC)          SOCIAL HISTORY  Social History     Tobacco Use    Smoking status: Never    Smokeless tobacco: Never   Vaping Use    Vaping status: Never Used   Substance Use Topics    Alcohol use: No    Drug use: No     Social History     Substance and Sexual Activity   Drug Use No       SURGICAL HISTORY  Past Surgical History:   Procedure Laterality Date    CATARACT PHACO WITH IOL  7/22/08    Performed by ANETA LANDA at SURGERY SAME DAY [facility]    GYN SURGERY      opherectomy, partial hyst    OTHER      myringotomty       CURRENT MEDICATIONS  None    ALLERGIES  Allergies   Allergen Reactions     "Amoxicillin Er Unspecified     General malaise    Aspirin     Keflex     Naproxen     Other Drug      Mycins, antidepressants    Penicillins     Sulfa Drugs     Telithromycin        PHYSICAL EXAM  VITAL SIGNS: /85   Pulse 87   Temp 36.7 °C (98.1 °F) (Temporal)   Resp 18   Ht 1.727 m (5' 8\")   Wt 71.7 kg (158 lb)   SpO2 96%   BMI 24.02 kg/m²   Reviewed and afebrile and otherwise normal  Constitutional: Well developed, Well nourished, afebrile.  Later she had elevated blood pressure in the 190s systolic.  HENT: Normocephalic, atraumatic, bilateral external ears normal, No intraoral erythema, edema, exudate  Eyes: PERRLA, conjunctiva pink, no scleral icterus.   Cardiovascular: Regular rate and rhythm. No murmurs, rubs or gallops.  No dependent edema or calf tenderness  Respiratory: Lungs clear to auscultation bilaterally. No wheezes, rales, or rhonchi.  Abdominal:  Abdomen soft, moderate left upper quadrant tenderness, non distended. No rebound, or guarding.    Skin: No erythema, no rash. No wounds or bruising.  Genitourinary: No costovertebral angle tenderness.   Musculoskeletal: no deformities.   Neurologic: Alert & oriented x 3, cranial nerves 2-12 intact by passive exam.  Moves 4 limbs with symmetric strength.  Psychiatric: Affect normal, Judgment normal, Mood normal.     LABS Ordered and Reviewed by Me:  Results for orders placed or performed during the hospital encounter of 06/13/24   CBC with Differential   Result Value Ref Range    WBC 9.8 4.8 - 10.8 K/uL    RBC 5.07 4.20 - 5.40 M/uL    Hemoglobin 15.2 12.0 - 16.0 g/dL    Hematocrit 45.7 37.0 - 47.0 %    MCV 90.1 81.4 - 97.8 fL    MCH 30.0 27.0 - 33.0 pg    MCHC 33.3 32.2 - 35.5 g/dL    RDW 42.2 35.9 - 50.0 fL    Platelet Count 233 164 - 446 K/uL    MPV 11.0 9.0 - 12.9 fL    Neutrophils-Polys 86.50 (H) 44.00 - 72.00 %    Lymphocytes 6.60 (L) 22.00 - 41.00 %    Monocytes 6.30 0.00 - 13.40 %    Eosinophils 0.00 0.00 - 6.90 %    Basophils 0.30 0.00 - " 1.80 %    Immature Granulocytes 0.30 0.00 - 0.90 %    Nucleated RBC 0.00 0.00 - 0.20 /100 WBC    Neutrophils (Absolute) 8.49 (H) 1.82 - 7.42 K/uL    Lymphs (Absolute) 0.65 (L) 1.00 - 4.80 K/uL    Monos (Absolute) 0.62 0.00 - 0.85 K/uL    Eos (Absolute) 0.00 0.00 - 0.51 K/uL    Baso (Absolute) 0.03 0.00 - 0.12 K/uL    Immature Granulocytes (abs) 0.03 0.00 - 0.11 K/uL    NRBC (Absolute) 0.00 K/uL   Complete Metabolic Panel   Result Value Ref Range    Sodium 138 135 - 145 mmol/L    Potassium 3.8 3.6 - 5.5 mmol/L    Chloride 104 96 - 112 mmol/L    Co2 20 20 - 33 mmol/L    Anion Gap 14.0 7.0 - 16.0    Glucose 107 (H) 65 - 99 mg/dL    Bun 18 8 - 22 mg/dL    Creatinine 0.61 0.50 - 1.40 mg/dL    Calcium 8.9 8.5 - 10.5 mg/dL    Correct Calcium 9.1 8.5 - 10.5 mg/dL    AST(SGOT) 12 12 - 45 U/L    ALT(SGPT) 5 2 - 50 U/L    Alkaline Phosphatase 66 30 - 99 U/L    Total Bilirubin 0.8 0.1 - 1.5 mg/dL    Albumin 3.7 3.2 - 4.9 g/dL    Total Protein 6.8 6.0 - 8.2 g/dL    Globulin 3.1 1.9 - 3.5 g/dL    A-G Ratio 1.2 g/dL   Lipase   Result Value Ref Range    Lipase 28 11 - 82 U/L   Urinalysis    Specimen: Urine, Clean Catch   Result Value Ref Range    Color Yellow     Character Clear     Specific Gravity 1.027 <1.035    Ph 5.5 5.0 - 8.0    Glucose Negative Negative mg/dL    Ketones 80 (A) Negative mg/dL    Protein Negative Negative mg/dL    Bilirubin Negative Negative    Urobilinogen, Urine 0.2 Negative    Nitrite Positive (A) Negative    Leukocyte Esterase Small (A) Negative    Occult Blood Small (A) Negative    Micro Urine Req Microscopic    Troponin   Result Value Ref Range    Troponin T 11 6 - 19 ng/L   ESTIMATED GFR   Result Value Ref Range    GFR (CKD-EPI) 88 >60 mL/min/1.73 m 2   URINE MICROSCOPIC (W/UA)   Result Value Ref Range    WBC 10-20 (A) /hpf    RBC 0-2 /hpf    Bacteria Many (A) None /hpf    Epithelial Cells Negative /hpf    Hyaline Cast 0-2 /lpf       Interpretations:      EKG Interpretation by  me    Indication:.Abdominal pain      Rhythm: normal sinus   Rate: Normal at 77  Axis: normal  Ectopy: PVCs  Conduction: Left bundle pattern  ST/T Waves: no acute change  Q Waves: none  R Wave progression: normal  Hypertrophy changes: Absent    Clinical Impression: Normal sinus rhythm with left bundle branch block and PVCs    RADIOLOGY  I have independently interpreted the chest x-ray associated with this visit demonstrating no pneumonia.  I am awaiting the final reading from the radiologist.     Final Radiology Report  CT-ABDOMEN-PELVIS WITH   Final Result      1.  No evidence of bowel obstruction or focal inflammatory change.      2.  Fatty liver.      3.  Constipation.      4.  Vascular calcification.      5.  Multiple bilateral renal parapelvic cysts with areas of cortical scarring bilaterally.      DX-CHEST-PORTABLE (1 VIEW)   Final Result      Interstitial prominence.        Radiologist interpretation have been reviewed by me.     ED COURSE:      INTERVENTIONS BY ME:  Medications   cefTRIAXone (Rocephin) injection 2,000 mg (has no administration in time range)   morphine 4 MG/ML injection 2 mg (has no administration in time range)   ondansetron (Zofran) syringe/vial injection 4 mg (has no administration in time range)   NS infusion 1,000 mL (1,000 mL Intravenous New Bag 6/13/24 1426)   ondansetron (Zofran) syringe/vial injection 4 mg (4 mg Intravenous Given 6/13/24 1426)   ondansetron (Zofran) syringe/vial injection 4 mg (4 mg Intravenous Given 6/13/24 1626)   prochlorperazine (Compazine) injection 10 mg (10 mg Intravenous Given 6/13/24 1653)   diphenhydrAMINE (Benadryl) injection 12.5 mg (12.5 mg Intravenous Given 6/13/24 1651)   iohexol (OMNIPAQUE) 350 mg/mL (IV) (100 mL Intravenous Given 6/13/24 1900)       6:46 PM - Patient reassessed and response to intervention still has nausea and pain will be given a dose of Zofran and morphine at this time along with Rocephin for UTI.  I discussed that I thought it  advisable that the patient be admitted for IV antibiotics antiemetics analgesics and IV fluids with the patient and her son want the patient to be discharged home.  They verbalized understanding that if she continues to vomit she will probably need to return for admission for IV antibiotics.  They wish to take this risk.      ASSESSMENT, COURSE AND PLAN:  PROBLEMS EVALUATED THIS VISIT:    This patient presents with left upper quadrant abdominal pain nausea and scant vomiting here.  She appears to have acute pyelonephritis based on urinalysis results.  She has ovarian cyst with no evidence of hydronephrosis or obstruction.  She does not appear to be septic.  There is no evidence of diverticulitis or bowel obstruction or mesenteric ischemia on CT imaging.  Given her nausea and vomiting I felt she should be admitted but she and her son refused.  They verbalized understanding if vomiting continues and she cannot keep the antibiotics down and does not improve she will need to return to the ER.  The patient also developed elevated blood pressure while here in the ER.  This is probably due to her pain and nausea.  She does not have a primary physician but has at an insurance.  She is to follow-up for blood pressure management once well and to see a doctor if her UTI symptoms do not improve in 3 days    Measures: HYDRATION: Intravenous fluids were medically necessary given suspected dehydration based on vomiting.       RISK:  High given need for IV opiate analgesics     PLAN:  New Prescriptions    CEFDINIR (OMNICEF) 300 MG CAP    Take 1 Capsule by mouth 2 times a day.    HYDROCODONE-ACETAMINOPHEN (NORCO) 5-325 MG TAB PER TABLET    Take 1 Tablet by mouth every 6 hours as needed (mild pain) for up to 2 days.    ONDANSETRON (ZOFRAN ODT) 4 MG TABLET DISPERSIBLE    Take 1 Tablet by mouth every 8 hours as needed for Nausea/Vomiting.     Start antibiotics tomorrow afternoon  Rest fluids Tylenol for mild pain    Pyeloephritis  handout given    Return for recurrent vomiting ill appearance dizziness persistent high fever    Followup:  [place] [id number] [facility] [id number]  [phone]  Schedule an appointment as soon as possible for a visit   with a new primary that takes your [employer] for blood pressure management      CONDITION: Stable.     FINAL IMPRESSION  1. Pyelonephritis         [place]ana Ogden M.D., 06/13/24 7:33 PM

## 2024-06-13 NOTE — ED TRIAGE NOTES
"Chief Complaint   Patient presents with    Abdominal Pain     PT reports ULQ pain x 1 week. PT reports NV since onset. PT reports constipation x 4 days. Denies urinary discomfort.      PT brought in by EMS  to triage for above complaint. GCS 15.     Pt is alert and oriented, speaking in full sentences, follows commands and responds appropriately to questions. NAD. Resp are even and unlabored.      Pt placed in lobby. Pt educated on triage process. Pt encouraged to alert staff for any changes.     Patient and staff wearing appropriate PPE      /85   Pulse 87   Temp 36.7 °C (98.1 °F) (Temporal)   Resp 18   Ht 1.727 m (5' 8\")   Wt 71.7 kg (158 lb)   SpO2 96%   BMI 24.02 kg/m²     "

## 2024-06-13 NOTE — ED NOTES
Rounded on pt and son. Pt and son updated on POC. Pt resting comfortably in [facility].  Call light within reach.

## 2024-06-14 NOTE — DISCHARGE INSTRUCTIONS
Pyelonephritis  (Urinary Tract Infection Involving the Kidney)    Start antibiotics tomorrow afternoon.  Take  for fever and Zofran for nausea and vomiting.  Norco instead of Tylenol for more severe pain..  Return for uncontrolled vomiting, ill appearance or dizziness with standing.  See your doctor or return if not better in 2-3 days.       Pyelonephritis is an inflammation (soreness) of the kidney. It is generally caused by infection. It usually affects only one kidney. It may affect both. Usually these kidney infections have spread from the lower urinary tract. Because the urethra (the opening to the bladder) is much shorter in females than in males, urinary tract infections are much more common in females.    SYMPTOMS (what seems to be the problem)  Usually infections of the kidney have an abrupt onset of chills and fever. There is often an ache in the flank (the back near the lower ribs). There is often a generalized malaise. There may be nausea (feeling sick to your stomach) and vomiting. Some times there are symptoms (problems) of cystitis (bladder infection and inflammation). These symptoms often include urgency, increased frequency of urination, and burning with urination.    Pyelonephritis will usually respond to antibiotics (medications that kill bacteria). When this illness is recognized and treated promptly, complications are not common. Hospitalization may be required. If treated at home, take all the medicine given to you until finished. You may feel better in a few days, but TAKE ALL THE MEDICINE or the infection may not respond. It can become more difficult to treat. Response can generally be expected in 7 to 10 days.    Drink lots of fluid, 3 to 4 quarts a day. Cranberry juice is especially recommended, in addition to large amounts of water. Avoid caffeine, tea and carbonated beverages (Coke®, 7-Up®, etc.), because they tend to irritate the bladder. Males should avoid alcohol as this may irritate  the prostate. Aspirin, ibuprofen (Advil® or Motrin®) or acetaminophen (Tylenol®) may be used for temperature and/or discomfort. Only use these if your caregiver has not given medications that interfere.    TO PREVENT FURTHER INFECTIONS:  Empty the bladder often. Avoid holding urine for long periods of time.  After a bowel movement, women should cleanse front to back. Only use each tissue once.  Empty the bladder before and after sexual intercourse.    If you develop an increase of back pain, fever, nausea, vomiting, or your symptoms are no better in three (3) days, seek medical attention. Seek medical attention sooner if you are getting worse.    Document Released: 12/18/2006    [employer]® Patient Information ©2008 [employer].

## 2024-06-14 NOTE — ED NOTES
Discharge teaching and paperwork provided including information regarding Rx. Questions answered. VSS, assessment stable. Patient belonging with patient. Patient D/C to the care of son and wheeled out of the ED.

## 2024-06-14 NOTE — ED NOTES
Bedside report to BENITA Duran.  Pt connected to monitor, on room air. Son at bedside. Call light within reach.